# Patient Record
Sex: MALE | Race: WHITE | Employment: FULL TIME | URBAN - METROPOLITAN AREA
[De-identification: names, ages, dates, MRNs, and addresses within clinical notes are randomized per-mention and may not be internally consistent; named-entity substitution may affect disease eponyms.]

---

## 2018-09-26 ENCOUNTER — HOSPITAL ENCOUNTER (EMERGENCY)
Age: 58
Discharge: HOME | End: 2018-09-27
Attending: EMERGENCY MEDICINE
Payer: COMMERCIAL

## 2018-09-26 DIAGNOSIS — R04.0 EPISTAXIS: Primary | ICD-10-CM

## 2018-09-26 PROCEDURE — 74011250637 HC RX REV CODE- 250/637: Performed by: EMERGENCY MEDICINE

## 2018-09-26 PROCEDURE — 75810000284 HC CNTRL NASAL HEMORHRAGE SIMPLE

## 2018-09-26 PROCEDURE — 99283 EMERGENCY DEPT VISIT LOW MDM: CPT

## 2018-09-26 RX ORDER — AMOXICILLIN AND CLAVULANATE POTASSIUM 875; 125 MG/1; MG/1
1 TABLET, FILM COATED ORAL
Status: COMPLETED | OUTPATIENT
Start: 2018-09-26 | End: 2018-09-26

## 2018-09-26 RX ORDER — AMOXICILLIN AND CLAVULANATE POTASSIUM 875; 125 MG/1; MG/1
1 TABLET, FILM COATED ORAL 2 TIMES DAILY
Qty: 6 TAB | Refills: 0 | Status: SHIPPED | OUTPATIENT
Start: 2018-09-27 | End: 2018-09-30

## 2018-09-26 RX ADMIN — BACITRACIN ZINC, NEOMYCIN SULFATE, POLYMYXIN B SULFATE 1 PACKET: 3.5; 5000; 4 OINTMENT TOPICAL at 23:04

## 2018-09-26 RX ADMIN — AMOXICILLIN AND CLAVULANATE POTASSIUM 1 TABLET: 875; 125 TABLET, FILM COATED ORAL at 23:42

## 2018-09-26 NOTE — LETTER
[facility] EMERGENCY DEPT 
[address] [place] [address] [place] [address] 
[phone] Work/School Note Date: 9/26/2018 To Whom It May concern: 
 
Shine Garcia was seen and treated today in the emergency room by the following provider(s): 
Attending Provider: Baljit Agarwal MD. Shine Garcia may return to work on 9/28/2018.  
 
Sincerely, 
 
 
 
 
Baljit Agarwal MD

## 2018-09-27 VITALS
SYSTOLIC BLOOD PRESSURE: 122 MMHG | HEIGHT: 73 IN | WEIGHT: 203 LBS | TEMPERATURE: 97.5 F | BODY MASS INDEX: 26.9 KG/M2 | DIASTOLIC BLOOD PRESSURE: 70 MMHG | RESPIRATION RATE: 20 BRPM | HEART RATE: 78 BPM | OXYGEN SATURATION: 99 %

## 2018-09-27 NOTE — ED NOTES
AIDET communication provided and informed of purposeful rounding to include collaboration of entire care team; patient acknowledged understanding. Nose bleeding controlled

## 2018-09-27 NOTE — ED PROVIDER NOTES
Patient is a 61 y.o. male presenting with epistaxis. The history is provided by the patient and the spouse. Epistaxis This is a recurrent problem. Episode onset: 5pm today. The problem has not changed since onset. The problem is associated with trauma (several months ago). The bleeding has been from the left nare. Past Medical History:  
Diagnosis Date  Rheumatoid arthritis(714.0) as child/20's  Trauma History reviewed. No pertinent surgical history. Family History:  
Problem Relation Age of Onset  Depression Mother  Stroke Father 80  
 Cancer Father 80  Cancer Brother 46  
  prostate Social History Social History  Marital status:  Spouse name: N/A  
 Number of children: N/A  
 Years of education: N/A Occupational History  Not on file. Social History Main Topics  Smoking status: Never Smoker  Smokeless tobacco: Never Used  Alcohol use 0.0 oz/week 2 - 3 Cans of beer per week Comment: occasionally  Drug use: No  
 Sexual activity: Yes  
  Partners: Female Other Topics Concern  Not on file Social History Narrative ALLERGIES: Review of patient's allergies indicates no known allergies. Review of Systems Constitutional: Negative for chills and fever. HENT: Positive for nosebleeds. Negative for facial swelling. Respiratory: Negative for chest tightness and shortness of breath. Neurological: Negative for dizziness, seizures, speech difficulty, weakness, light-headedness and headaches. Vitals:  
 09/26/18 2249 09/26/18 2314 BP: 127/76 122/70 Pulse: 88 78 Resp: 16 20 Temp: 97.5 °F (36.4 °C) SpO2: 98% 99% Weight: 92.1 kg (203 lb) Height: 6' 1\" (1.854 m) Physical Exam  
Constitutional: He is oriented to person, place, and time. He appears well-developed and well-nourished. HENT:  
Head: Normocephalic.   
Right Ear: Tympanic membrane and ear canal normal.  
 Left Ear: Tympanic membrane and ear canal normal.  
Nose: No rhinorrhea, nose lacerations or nasal septal hematoma. Epistaxis is observed. Left nostril with bleeding noted, unable to localize the bleeding on my exam  
Pulmonary/Chest: Effort normal. No respiratory distress. Neurological: He is alert and oriented to person, place, and time. Coordination normal.  
Nursing note and vitals reviewed. MDM Number of Diagnoses or Management Options Epistaxis:  
Diagnosis management comments: Epistaxis - will pack nose and refer to ENT - start ABX to help prevent sinus infection or Toxic shock syndrome ED Course Epistaxis Management Date/Time: 9/26/2018 11:46 PM 
Performed by: Dimitri Hinojosa Authorized by: Dimitri Hinojosa Consent:  
  Consent obtained:  Verbal 
  Consent given by:  Patient Risks discussed:  Bleeding, infection, nasal injury and pain Alternatives discussed:  Referral 
Anesthesia (see MAR for exact dosages): Anesthesia method:  None Procedure details:  
  Treatment site:  L anterior Treatment method:  Merocel sponge Treatment complexity:  Limited Treatment episode: initial   
Post-procedure details:  
  Assessment:  Bleeding decreased (only an occasional drop of blood, may be more of blood tinged saline from the saline I used to expand the nasal tampon) Patient tolerance of procedure: Tolerated well, no immediate complications

## 2018-09-27 NOTE — DISCHARGE INSTRUCTIONS
Nosebleeds: Care Instructions  Your Care Instructions    Nosebleeds are common, especially if you have colds or allergies. Many things can cause a nosebleed. Some nosebleeds stop on their own with pressure. Others need packing. Some get cauterized (sealed). If you have gauze or other packing materials in your nose, you will need to follow up with your doctor to have the packing removed. You may need more treatment if you get nosebleeds a lot. The doctor has checked you carefully, but problems can develop later. If you notice any problems or new symptoms, get medical treatment right away. Follow-up care is a key part of your treatment and safety. Be sure to make and go to all appointments, and call your doctor if you are having problems. It's also a good idea to know your test results and keep a list of the medicines you take. How can you care for yourself at home? · If you get another nosebleed:  ¨ Sit up and tilt your head slightly forward. This keeps blood from going down your throat. ¨ Use your thumb and index finger to pinch your nose shut for 10 minutes. Use a clock. Do not check to see if the bleeding has stopped before the 10 minutes are up. If the bleeding has not stopped, pinch your nose shut for another 10 minutes. ¨ When the bleeding has stopped, try not to pick, rub, or blow your nose for 12 hours. Avoiding these things helps keep your nose from bleeding again. · If your doctor prescribed antibiotics, take them as directed. Do not stop taking them just because you feel better. You need to take the full course of antibiotics. To prevent nosebleeds  · Do not blow your nose too hard. · Try not to lift or strain after a nosebleed. · Raise your head on a pillow while you sleep. · Put a thin layer of a saline- or water-based nasal gel, such as NasoGel, inside your nose. Put it on the septum, which divides your nostrils. This will prevent dryness that can cause nosebleeds.   · Use a vaporizer or humidifier to add moisture to your bedroom. Follow the directions for cleaning the machine. · Do not use aspirin, ibuprofen (Advil, Motrin), or naproxen (Aleve) for 36 to 48 hours after a nosebleed unless your doctor tells you to. You can use acetaminophen (Tylenol) for pain relief. · Talk to your doctor about stopping any other medicines you are taking. Some medicines may make you more likely to get a nosebleed. · Do not use cold medicines or nasal sprays without first talking to your doctor. They can make your nose dry. When should you call for help? Call 911 anytime you think you may need emergency care. For example, call if:    · You passed out (lost consciousness).    Call your doctor now or seek immediate medical care if:    · You get another nosebleed and your nose is still bleeding after you have applied pressure 3 times for 10 minutes each time (30 minutes total).     · There is a lot of blood running down the back of your throat even after you pinch your nose and tilt your head forward.     · You have a fever.     · You have sinus pain.    Watch closely for changes in your health, and be sure to contact your doctor if:    · You get nosebleeds often, even if they stop.     · You do not get better as expected. Where can you learn more? Go to http://alireza-to.info/. Enter S156 in the search box to learn more about \"Nosebleeds: Care Instructions. \"  Current as of: November 20, 2017  Content Version: 11.7  © 3813-3110 [employer]. Care instructions adapted under license by [employer] (which disclaims liability or warranty for this information). If you have questions about a medical condition or this instruction, always ask your healthcare professional. [address] any warranty or liability for your use of this information.

## 2018-09-27 NOTE — ED NOTES
The patient was discharged home by  Dr. Francois Stockton and Cornelia Yarbrough rn in stable condition, accompanied by family. The patient is alert and oriented, is in no respiratory distress and has vital signs within normal limits . The patient's diagnosis, condition and treatment were explained to patient. The patient expressed understanding. Prescriptions given to pt. No work/school note given to pt. A discharge plan has been developed. A  was not involved in the process. Aftercare instructions were given to the patient. family will transport pt home.

## 2018-09-27 NOTE — ED TRIAGE NOTES
Triage note:  Pt arrived with c/o nose bleed that started ~ 5 pm today. Pt stated he fell 5 months ago and hit nose and had a nose bleed at that time. Pt stated he fills that his nose hasn't stopped bleeding since then.

## 2019-01-01 ENCOUNTER — OFFICE VISIT (OUTPATIENT)
Dept: HEMATOLOGY | Age: 59
End: 2019-01-01

## 2019-01-01 ENCOUNTER — TELEPHONE (OUTPATIENT)
Dept: HEMATOLOGY | Age: 59
End: 2019-01-01

## 2019-01-01 VITALS
BODY MASS INDEX: 27.66 KG/M2 | WEIGHT: 208.7 LBS | TEMPERATURE: 98.1 F | SYSTOLIC BLOOD PRESSURE: 121 MMHG | OXYGEN SATURATION: 99 % | HEART RATE: 88 BPM | HEIGHT: 73 IN | DIASTOLIC BLOOD PRESSURE: 80 MMHG

## 2019-01-01 DIAGNOSIS — R74.8 ELEVATED LIVER ENZYMES: Primary | ICD-10-CM

## 2019-01-01 LAB
A1AT SERPL-MCNC: 163 MG/DL (ref 90–200)
ACTIN IGG SERPL-ACNC: 13 UNITS (ref 0–19)
ALBUMIN SERPL-MCNC: 4 G/DL (ref 3.5–5.5)
ALP SERPL-CCNC: 100 IU/L (ref 39–117)
ALT SERPL-CCNC: 37 IU/L (ref 0–44)
ANA TITR SER IF: NEGATIVE {TITER}
AST SERPL-CCNC: 81 IU/L (ref 0–40)
BASOPHILS # BLD AUTO: 0.1 X10E3/UL (ref 0–0.2)
BASOPHILS NFR BLD AUTO: 1 %
BILIRUB DIRECT SERPL-MCNC: 1.12 MG/DL (ref 0–0.4)
BILIRUB SERPL-MCNC: 3.8 MG/DL (ref 0–1.2)
BUN SERPL-MCNC: 5 MG/DL (ref 6–24)
BUN/CREAT SERPL: 7 (ref 9–20)
C-ANCA TITR SER IF: NORMAL TITER
CALCIUM SERPL-MCNC: 9.6 MG/DL (ref 8.7–10.2)
CERULOPLASMIN SERPL-MCNC: 26.6 MG/DL (ref 16–31)
CHLORIDE SERPL-SCNC: 100 MMOL/L (ref 96–106)
CO2 SERPL-SCNC: 25 MMOL/L (ref 20–29)
COMMENT, 144067: NORMAL
CREAT SERPL-MCNC: 0.72 MG/DL (ref 0.76–1.27)
EOSINOPHIL # BLD AUTO: 0.3 X10E3/UL (ref 0–0.4)
EOSINOPHIL NFR BLD AUTO: 4 %
ERYTHROCYTE [DISTWIDTH] IN BLOOD BY AUTOMATED COUNT: 13.3 % (ref 12.3–15.4)
FERRITIN SERPL-MCNC: 654 NG/ML (ref 30–400)
GLUCOSE SERPL-MCNC: 105 MG/DL (ref 65–99)
HAV AB SER QL IA: NEGATIVE
HBV CORE AB SERPL QL IA: NEGATIVE
HBV SURFACE AB SER QL: NON REACTIVE
HBV SURFACE AG SERPL QL IA: NEGATIVE
HCT VFR BLD AUTO: 40.1 % (ref 37.5–51)
HCV AB S/CO SERPL IA: <0.1 S/CO RATIO (ref 0–0.9)
HGB BLD-MCNC: 14.1 G/DL (ref 13–17.7)
IMM GRANULOCYTES # BLD AUTO: 0 X10E3/UL (ref 0–0.1)
IMM GRANULOCYTES NFR BLD AUTO: 0 %
INR PPP: 1.4 (ref 0.8–1.2)
IRON SATN MFR SERPL: 50 % (ref 15–55)
IRON SERPL-MCNC: 158 UG/DL (ref 38–169)
LYMPHOCYTES # BLD AUTO: 2.8 X10E3/UL (ref 0.7–3.1)
LYMPHOCYTES NFR BLD AUTO: 34 %
MCH RBC QN AUTO: 36.2 PG (ref 26.6–33)
MCHC RBC AUTO-ENTMCNC: 35.2 G/DL (ref 31.5–35.7)
MCV RBC AUTO: 103 FL (ref 79–97)
MITOCHONDRIA M2 IGG SER-ACNC: <20 UNITS (ref 0–20)
MONOCYTES # BLD AUTO: 0.7 X10E3/UL (ref 0.1–0.9)
MONOCYTES NFR BLD AUTO: 8 %
NEUTROPHILS # BLD AUTO: 4.3 X10E3/UL (ref 1.4–7)
NEUTROPHILS NFR BLD AUTO: 53 %
P-ANCA ATYPICAL TITR SER IF: NORMAL TITER
P-ANCA TITR SER IF: NORMAL TITER
PLATELET # BLD AUTO: 106 X10E3/UL (ref 150–450)
POTASSIUM SERPL-SCNC: 3.9 MMOL/L (ref 3.5–5.2)
PROT SERPL-MCNC: 7.6 G/DL (ref 6–8.5)
PROTHROMBIN TIME: 14.2 SEC (ref 9.1–12)
RBC # BLD AUTO: 3.9 X10E6/UL (ref 4.14–5.8)
SODIUM SERPL-SCNC: 139 MMOL/L (ref 134–144)
TIBC SERPL-MCNC: 318 UG/DL (ref 250–450)
UIBC SERPL-MCNC: 160 UG/DL (ref 111–343)
WBC # BLD AUTO: 8.1 X10E3/UL (ref 3.4–10.8)

## 2019-07-09 PROBLEM — R74.8 ELEVATED LIVER ENZYMES: Status: ACTIVE | Noted: 2019-01-01

## 2019-07-09 PROBLEM — D69.6 THROMBOCYTOPENIA (HCC): Status: ACTIVE | Noted: 2019-01-01

## 2019-07-09 PROBLEM — M45.9 ANKYLOSING SPONDYLITIS (HCC): Status: ACTIVE | Noted: 2019-01-01

## 2019-07-09 PROBLEM — R26.89 BALANCE PROBLEMS: Status: ACTIVE | Noted: 2019-01-01

## 2019-07-09 NOTE — PROGRESS NOTES
Chief Complaint   Patient presents with    New Patient     Elevated Liver Enzymes     Visit Vitals  /80 (BP 1 Location: Left arm, BP Patient Position: Sitting)   Pulse 88   Temp 98.1 °F (36.7 °C) (Tympanic)   Ht 6' 1\" (1.854 m)   Wt 208 lb 11.2 oz (94.7 kg)   SpO2 99%   BMI 27.53 kg/m²     Abuse Screening Questionnaire 7/9/2019   Do you ever feel afraid of your partner? N   Are you in a relationship with someone who physically or mentally threatens you? N   Is it safe for you to go home?  Y     Learning Assessment 7/9/2019   PRIMARY LEARNER Patient   HIGHEST LEVEL OF EDUCATION - PRIMARY LEARNER  > 4 YEARS OF COLLEGE   BARRIERS PRIMARY LEARNER NONE   PRIMARY LANGUAGE ENGLISH   LEARNER PREFERENCE PRIMARY LISTENING   ANSWERED BY patient    RELATIONSHIP SELF

## 2019-07-09 NOTE — PROGRESS NOTES
[facility], Jerald CANTU Remigio Saliva, MD Norberto Martinet, PA-C Natha Carrow, [facility]     Erendira CABRERA Yue, [facility]   DENICE Davis-MARYCHUY Wilkins, [facility]       Anahi Gray [facility] 136    at [address], [address] [place] [address], [facility] 22.    [phone]    FAX: [address] [place] [address], [address]    [phone]    FAX: [phone]       Patient Care Team:  Kyra Colindres DO as PCP - General (Internal Medicine)      Problem List  Date Reviewed: 11/13/2013          Codes Class Noted    Elevated liver enzymes ICD-10-CM: R74.8  ICD-9-CM: 790.5  7/9/2019            The clinicians listed above have asked me to see Justina Yao in consultation regarding elevated liver enzymes and its management. All medical records sent by the referring physicians were reviewed including imaging studies and laboratory studies. The patient is a 60 y.o.  male who was found to have abnormalities in liver enzymes in summer of 2013. Serologic evaluation for markers of chronic liver disease was negative for ASMA. Ferritin was elevated at 658      Ultrasound of the liver was performed in 6/2019 which showed abnormal appearance of the liver with diffuse hepatic heterogeneity consistent with chronic liver disease. An assessment of liver fibrosis with biopsy or elastography has not been performed. The patient had not started any new medications within 3 months preceding the elevation in liver chemistries. The patient has no symptoms which can be attributed to the liver disorder.     The patient has not experienced the following symptoms: fatigue, pain in the right side over the liver, yellowing of the eyes or skin, itching, dark urine, problems concentrating,   . The patient completes all daily activities without any functional limitations. ASSESSMENT AND PLAN:  Elevated liver enzymes  Persistent elevation in liver transaminases and alkaline phosphatase of unclear etiology at this time. AST is elevated. ALT is normal.  ALP is normal.  Total bilirubin is elevated. Serum albumin is normal.  INR is prolonged. The platelet count is depressed. Based upon laboratory studies the patient appears to have advanced liver disease. Serologic testing for causes of chronic liver disease were ordered. All were negative     The most likely causes for the liver chemistry abnormalities were discussed with the patient and include fatty liver disease do to metabolic syndrome     Have performed laboratory testing to monitor liver function and degree of liver injury. This included BMP, hepatic panel, CBC with platelet count, INR. Will perform imaging of the liver with ultrasound. The need to perform a liver biopsy to help determine the cause and severity of the liver test abnormalities was discussed. The risks of performing the liver biopsy including pain, puncture of the lung, gallbladder, intestine or kidney and bleeding were discussed. The patient has decided to have a liver biopsy. This will be scheduled. 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 This benign genetic disorder of bilirubin conjugation has no clinical significance. Elevation in Ferritin  There is an elevation in ferritin with normal iron saturation. It is unlikely that the patient has hemochromatosis or is a carrier for an HFE gene. Will order HFE genetic testing. The elevation in ferritin is probably secondary to alcohol use.       Screening for Hepatocellular Carcinoma  HCC screening is not necessary if the patient has no evidence of cirrhosis. Treatment of other medical problems in patients with chronic liver disease  There are no contraindications for the patient to take most medications that are necessary for treatment of other medical issues. Counseling for alcohol in patients with chronic liver disease  The patient was counseled regarding alcohol consumption and the effect of alcohol on chronic liver disease. Vaccinations   Vaccination for viral hepatitis A and B is recommended since the patient has no serologic evidence of previous exposure or vaccination with immunity. Routine vaccinations against other bacterial and viral agents can be performed as indicated. Annual flu vaccination should be administered if indicated. ALLERGIES  No Known Allergies    MEDICATIONS  No current outpatient medications on file. No current facility-administered medications for this visit. SYSTEM REVIEW NOT RELATED TO LIVER DISEASE OR REVIEWED ABOVE:  Constitution systems: Negative for fever, chills, weight gain, weight loss. Eyes: Negative for visual changes. ENT: Negative for sore throat, painful swallowing. Respiratory: Negative for cough, hemoptysis, SOB. Cardiology: Negative for chest pain, palpitations. GI:  Negative for constipation or diarrhea. : Negative for urinary frequency, dysuria, hematuria, nocturia. Skin: Negative for rash. Hematology: Negative for easy bruising, blood clots. Musculo-skelatal: Negative for back pain, muscle pain, weakness. Neurologic: Negative for headaches, dizziness, vertigo, memory problems not related to HE. Psychology: Negative for anxiety, depression. FAMILY HISTORY:  The father is  from complications of stroke. The mother .  of of natural causes  There is no family history of liver disease. SOCIAL HISTORY:  The patient is . The patient has 2 children.     The patient does not smoke cigarettes  The patient drinks 1 alcoholic beverage daily.  The patient currently works full time for [employer] as a .      PHYSICAL EXAMINATION:  Visit Vitals  /80 (BP 1 Location: Left arm, BP Patient Position: Sitting)   Pulse 88   Temp 98.1 °F (36.7 °C) (Tympanic)   Ht 6' 1\" (1.854 m)   Wt 208 lb 11.2 oz (94.7 kg)   SpO2 99%   BMI 27.53 kg/m²     General: No acute distress. Eyes: Sclera anicteric. ENT: No oral lesions. Thyroid normal.  Nodes: No adenopathy. Skin: No spider angiomata. No jaundice. No palmar erythema. Respiratory: Lungs clear to auscultation. Cardiovascular: Regular heart rate. No murmurs. No JVD. Abdomen: Soft non-tender. Liver size normal to percussion/palpation. Spleen not palpable. No obvious ascites. Extremities: No edema. No muscle wasting. No gross arthritic changes. Neurologic: Alert and oriented. Cranial nerves grossly intact. No asterixis.     LABORATORY STUDIES:  [facility] [address] Units 7/9/2019   WBC 3.4 - 10.8 x10E3/uL 8.1   ANC 1.4 - 7.0 x10E3/uL 4.3   HGB 13.0 - 17.7 g/dL 14.1    - 450 x10E3/uL 106 (L)   INR 0.8 - 1.2 1.4 (H)   AST 0 - 40 IU/L 81 (H)   ALT 0 - 44 IU/L 37   Alk Phos 39 - 117 IU/L 100   Bili, Total 0.0 - 1.2 mg/dL 3.8 (H)   Bili, Direct 0.00 - 0.40 mg/dL 1.12 (H)   Albumin 3.5 - 5.5 g/dL 4.0   BUN 6 - 24 mg/dL 5 (L)   Creat 0.76 - 1.27 mg/dL 0.72 (L)   Na 134 - 144 mmol/L 139   K 3.5 - 5.2 mmol/L 3.9   Cl 96 - 106 mmol/L 100   CO2 20 - 29 mmol/L 25   Glucose 65 - 99 mg/dL 105 (H)     SEROLOGIES:  Serologies Latest Ref Rng & Units 7/9/2019   Hep A Ab, Total Negative Negative   Hep B Surface Ag Negative Negative   Hep B Core Ab, Total Negative Negative   Hep B Surface AB QL  Non Reactive   Hep C Ab 0.0 - 0.9 s/co ratio <0.1   Ferritin 30 - 400 ng/mL 654 (H)   Iron % Saturation 15 - 55 % 50   DALIA, IFA  Negative   C-ANCA Neg:<1:20 titer <1:20   P-ANCA Neg:<1:20 titer <1:20   ANCA Neg:<1:20 titer <1:20   ASMCA 0 - 19 Units 13   M2 Ab 0.0 - 20.0 Units <20.0   Ceruloplasmin 16.0 - 31.0 mg/dL 26.6   Alpha-1 antitrypsin level 90 - 200 mg/dL 163     LIVER HISTOLOGY:  Not available or performed    ENDOSCOPIC PROCEDURES:  Not available or performed    RADIOLOGY:  Not available or performed    OTHER TESTING:  Not available or performed    FOLLOW-UP:  All of the issues listed above in the Assessment and Plan were discussed with the patient. All questions were answered. The patient expressed a clear understanding of the above. [address] [place] [address] in 4 weeks for Fibroscan to review all data and determine the treatment plan.     Krunal Roberts MD  [address], [address] [place] [address] 22.  [phone]  [address] [place] [address]

## 2019-07-09 NOTE — Clinical Note
7/27/19 Patient: Xavier Duenas YOB: 1960 Date of Visit: 7/9/2019 [address] [address] VIA Facsimile: [phone] Dear Balwinder Coburn DO, Thank you for referring Mr. Khadijah Vazquez to [facility] for evaluation. My notes for this consultation are attached. If you have questions, please do not hesitate to call me. I look forward to following your patient along with you. Sincerely, Emily Zapata MD

## 2019-09-10 NOTE — TELEPHONE ENCOUNTER
Spoke with patient. Patient rescheduled for LBX in Oct 2019. Follow up with fibroscan  scheduled two weeks later Nov 2019. Instructions for lbx and follow fibroscan mailed to patient. Patient had no further questions.

## 2020-01-01 ENCOUNTER — HOSPITAL ENCOUNTER (INPATIENT)
Age: 60
LOS: 1 days | DRG: 673 | End: 2020-04-16
Attending: EMERGENCY MEDICINE
Payer: COMMERCIAL

## 2020-01-01 ENCOUNTER — APPOINTMENT (OUTPATIENT)
Dept: CT IMAGING | Age: 60
DRG: 673 | End: 2020-01-01
Attending: EMERGENCY MEDICINE
Payer: COMMERCIAL

## 2020-01-01 ENCOUNTER — ANESTHESIA EVENT (OUTPATIENT)
Dept: SURGERY | Age: 60
DRG: 673 | End: 2020-01-01
Payer: COMMERCIAL

## 2020-01-01 ENCOUNTER — APPOINTMENT (OUTPATIENT)
Dept: GENERAL RADIOLOGY | Age: 60
DRG: 673 | End: 2020-01-01
Payer: COMMERCIAL

## 2020-01-01 ENCOUNTER — ANESTHESIA (OUTPATIENT)
Dept: SURGERY | Age: 60
DRG: 673 | End: 2020-01-01
Payer: COMMERCIAL

## 2020-01-01 ENCOUNTER — APPOINTMENT (OUTPATIENT)
Dept: NON INVASIVE DIAGNOSTICS | Age: 60
DRG: 673 | End: 2020-01-01
Attending: INTERNAL MEDICINE
Payer: COMMERCIAL

## 2020-01-01 ENCOUNTER — APPOINTMENT (OUTPATIENT)
Dept: GENERAL RADIOLOGY | Age: 60
DRG: 673 | End: 2020-01-01
Attending: EMERGENCY MEDICINE
Payer: COMMERCIAL

## 2020-01-01 VITALS
WEIGHT: 220 LBS | SYSTOLIC BLOOD PRESSURE: 50 MMHG | TEMPERATURE: 98.8 F | HEIGHT: 76 IN | OXYGEN SATURATION: 58 % | DIASTOLIC BLOOD PRESSURE: 40 MMHG | BODY MASS INDEX: 26.79 KG/M2

## 2020-01-01 DIAGNOSIS — I72.2 ANEURYSM OF RIGHT RENAL ARTERY (HCC): Primary | ICD-10-CM

## 2020-01-01 DIAGNOSIS — R17 JAUNDICE: ICD-10-CM

## 2020-01-01 DIAGNOSIS — R79.1 ABNORMAL INR: ICD-10-CM

## 2020-01-01 DIAGNOSIS — D69.6 THROMBOCYTOPENIA (HCC): ICD-10-CM

## 2020-01-01 DIAGNOSIS — R74.01 TRANSAMINITIS: ICD-10-CM

## 2020-01-01 DIAGNOSIS — E80.6 BILIRUBINEMIA: ICD-10-CM

## 2020-01-01 DIAGNOSIS — R17 SCLERAL ICTERUS: ICD-10-CM

## 2020-01-01 DIAGNOSIS — E87.20 LACTIC ACIDOSIS: ICD-10-CM

## 2020-01-01 LAB
ALBUMIN SERPL-MCNC: 1.3 G/DL (ref 3.5–5)
ALBUMIN SERPL-MCNC: 1.6 G/DL (ref 3.5–5)
ALBUMIN SERPL-MCNC: 2.3 G/DL (ref 3.5–5)
ALBUMIN/GLOB SERPL: 0.6 {RATIO} (ref 1.1–2.2)
ALBUMIN/GLOB SERPL: 0.9 {RATIO} (ref 1.1–2.2)
ALBUMIN/GLOB SERPL: 0.9 {RATIO} (ref 1.1–2.2)
ALP SERPL-CCNC: 124 U/L (ref 45–117)
ALP SERPL-CCNC: 46 U/L (ref 45–117)
ALP SERPL-CCNC: 50 U/L (ref 45–117)
ALT SERPL-CCNC: 242 U/L (ref 12–78)
ALT SERPL-CCNC: 48 U/L (ref 12–78)
ALT SERPL-CCNC: 76 U/L (ref 12–78)
ANION GAP SERPL CALC-SCNC: 11 MMOL/L (ref 5–15)
ANION GAP SERPL CALC-SCNC: 18 MMOL/L (ref 5–15)
ANION GAP SERPL CALC-SCNC: 22 MMOL/L (ref 5–15)
ANION GAP SERPL CALC-SCNC: 24 MMOL/L (ref 5–15)
APTT PPP: >130 SEC (ref 22.1–32)
ARTERIAL PATENCY WRIST A: ABNORMAL
AST SERPL-CCNC: 1364 U/L (ref 15–37)
AST SERPL-CCNC: 153 U/L (ref 15–37)
AST SERPL-CCNC: 419 U/L (ref 15–37)
ATRIAL RATE: 103 BPM
AV VELOCITY RATIO: 0.63
BASE DEFICIT BLDA-SCNC: 18 MMOL/L
BASE DEFICIT BLDA-SCNC: 21.1 MMOL/L
BASE DEFICIT BLDA-SCNC: 25.1 MMOL/L
BASE DEFICIT BLDA-SCNC: 27 MMOL/L
BASE DEFICIT BLDA-SCNC: 27 MMOL/L
BASOPHILS # BLD: 0 K/UL (ref 0–0.1)
BASOPHILS # BLD: 0.1 K/UL (ref 0–0.1)
BASOPHILS NFR BLD: 0 % (ref 0–1)
BASOPHILS NFR BLD: 1 % (ref 0–1)
BDY SITE: ABNORMAL
BILIRUB DIRECT SERPL-MCNC: 3.5 MG/DL (ref 0–0.2)
BILIRUB SERPL-MCNC: 2.6 MG/DL (ref 0.2–1)
BILIRUB SERPL-MCNC: 2.8 MG/DL (ref 0.2–1)
BILIRUB SERPL-MCNC: 6.5 MG/DL (ref 0.2–1)
BUN SERPL-MCNC: 4 MG/DL (ref 6–20)
BUN SERPL-MCNC: 5 MG/DL (ref 6–20)
BUN SERPL-MCNC: 6 MG/DL (ref 6–20)
BUN SERPL-MCNC: 6 MG/DL (ref 6–20)
BUN/CREAT SERPL: 3 (ref 12–20)
BUN/CREAT SERPL: 4 (ref 12–20)
CALCIUM SERPL-MCNC: 6.2 MG/DL (ref 8.5–10.1)
CALCIUM SERPL-MCNC: 7.5 MG/DL (ref 8.5–10.1)
CALCIUM SERPL-MCNC: 8.1 MG/DL (ref 8.5–10.1)
CALCIUM SERPL-MCNC: 8.1 MG/DL (ref 8.5–10.1)
CALCULATED P AXIS, ECG09: -9 DEGREES
CALCULATED R AXIS, ECG10: 17 DEGREES
CALCULATED T AXIS, ECG11: 31 DEGREES
CHLORIDE SERPL-SCNC: 100 MMOL/L (ref 97–108)
CHLORIDE SERPL-SCNC: 110 MMOL/L (ref 97–108)
CHLORIDE SERPL-SCNC: 111 MMOL/L (ref 97–108)
CHLORIDE SERPL-SCNC: 115 MMOL/L (ref 97–108)
CO2 SERPL-SCNC: 12 MMOL/L (ref 21–32)
CO2 SERPL-SCNC: 13 MMOL/L (ref 21–32)
CO2 SERPL-SCNC: 15 MMOL/L (ref 21–32)
CO2 SERPL-SCNC: 26 MMOL/L (ref 21–32)
COMMENT, HOLDF: NORMAL
CORTIS SERPL-MCNC: 32.9 UG/DL
CREAT SERPL-MCNC: 1.1 MG/DL (ref 0.7–1.3)
CREAT SERPL-MCNC: 1.35 MG/DL (ref 0.7–1.3)
CREAT SERPL-MCNC: 1.65 MG/DL (ref 0.7–1.3)
CREAT SERPL-MCNC: 2.03 MG/DL (ref 0.7–1.3)
DIAGNOSIS, 93000: NORMAL
DIFFERENTIAL METHOD BLD: ABNORMAL
ECHO AO ROOT DIAM: 3.63 CM
ECHO AV AREA PEAK VELOCITY: 1.8 CM2
ECHO AV PEAK GRADIENT: 6.7 MMHG
ECHO AV PEAK VELOCITY: 129.88 CM/S
ECHO LA MAJOR AXIS: 2.68 CM
ECHO LA TO AORTIC ROOT RATIO: 0.74
ECHO LA VOL 2C: 17.94 ML (ref 18–58)
ECHO LA VOL 4C: 28.64 ML (ref 18–58)
ECHO LA VOL BP: 26.72 ML (ref 18–58)
ECHO LA VOL/BSA BIPLANE: 11.58 ML/M2 (ref 16–28)
ECHO LA VOLUME INDEX A2C: 7.78 ML/M2 (ref 16–28)
ECHO LA VOLUME INDEX A4C: 12.41 ML/M2 (ref 16–28)
ECHO LV INTERNAL DIMENSION DIASTOLIC: 4.15 CM (ref 4.2–5.9)
ECHO LV INTERNAL DIMENSION SYSTOLIC: 3.05 CM
ECHO LV IVSD: 1.23 CM (ref 0.6–1)
ECHO LV MASS 2D: 172.1 G (ref 88–224)
ECHO LV MASS INDEX 2D: 74.6 G/M2 (ref 49–115)
ECHO LV POSTERIOR WALL DIASTOLIC: 0.92 CM (ref 0.6–1)
ECHO LVOT DIAM: 1.91 CM
ECHO LVOT PEAK GRADIENT: 2.7 MMHG
ECHO LVOT PEAK VELOCITY: 81.73 CM/S
ECHO MV A VELOCITY: 73.23 CM/S
ECHO MV E DECELERATION TIME (DT): 152.2 MS
ECHO MV E VELOCITY: 40.62 CM/S
ECHO MV E/A RATIO: 0.55
ECHO PV MAX VELOCITY: 76.41 CM/S
ECHO PV PEAK GRADIENT: 2.3 MMHG
ECHO RV INTERNAL DIMENSION: 3.65 CM
ECHO TV REGURGITANT MAX VELOCITY: 234.8 CM/S
ECHO TV REGURGITANT PEAK GRADIENT: 22.1 MMHG
EOSINOPHIL # BLD: 0 K/UL (ref 0–0.4)
EOSINOPHIL # BLD: 0.1 K/UL (ref 0–0.4)
EOSINOPHIL # BLD: 0.2 K/UL (ref 0–0.4)
EOSINOPHIL NFR BLD: 0 % (ref 0–7)
EOSINOPHIL NFR BLD: 1 % (ref 0–7)
EOSINOPHIL NFR BLD: 2 % (ref 0–7)
EPAP/CPAP/PEEP, PAPEEP: 6
ERYTHROCYTE [DISTWIDTH] IN BLOOD BY AUTOMATED COUNT: 16.2 % (ref 11.5–14.5)
ERYTHROCYTE [DISTWIDTH] IN BLOOD BY AUTOMATED COUNT: 17.2 % (ref 11.5–14.5)
ERYTHROCYTE [DISTWIDTH] IN BLOOD BY AUTOMATED COUNT: 18.1 % (ref 11.5–14.5)
ERYTHROCYTE [DISTWIDTH] IN BLOOD BY AUTOMATED COUNT: 18.4 % (ref 11.5–14.5)
ERYTHROCYTE [DISTWIDTH] IN BLOOD BY AUTOMATED COUNT: 21.7 % (ref 11.5–14.5)
ERYTHROCYTE [DISTWIDTH] IN BLOOD BY AUTOMATED COUNT: 22.4 % (ref 11.5–14.5)
FIBRINOGEN PPP-MCNC: <60 MG/DL (ref 200–475)
FIO2 ON VENT: 100 %
GAS FLOW.O2 SETTING OXYMISER: 12 L/MIN
GAS FLOW.O2 SETTING OXYMISER: 22 L/MIN
GAS FLOW.O2 SETTING OXYMISER: 22 L/MIN
GAS FLOW.O2 SETTING OXYMISER: 30 L/MIN
GAS FLOW.O2 SETTING OXYMISER: 30 L/MIN
GLOBULIN SER CALC-MCNC: 1.4 G/DL (ref 2–4)
GLOBULIN SER CALC-MCNC: 1.8 G/DL (ref 2–4)
GLOBULIN SER CALC-MCNC: 4 G/DL (ref 2–4)
GLUCOSE SERPL-MCNC: 130 MG/DL (ref 65–100)
GLUCOSE SERPL-MCNC: 155 MG/DL (ref 65–100)
GLUCOSE SERPL-MCNC: 171 MG/DL (ref 65–100)
GLUCOSE SERPL-MCNC: 180 MG/DL (ref 65–100)
HCO3 BLDA-SCNC: 12 MMOL/L (ref 22–26)
HCO3 BLDA-SCNC: 14 MMOL/L (ref 22–26)
HCO3 BLDA-SCNC: 6 MMOL/L (ref 22–26)
HCO3 BLDA-SCNC: 8 MMOL/L (ref 22–26)
HCO3 BLDA-SCNC: 9 MMOL/L (ref 22–26)
HCT VFR BLD AUTO: 18.4 % (ref 36.6–50.3)
HCT VFR BLD AUTO: 19.1 % (ref 36.6–50.3)
HCT VFR BLD AUTO: 23.5 % (ref 36.6–50.3)
HCT VFR BLD AUTO: 24.5 % (ref 36.6–50.3)
HCT VFR BLD AUTO: 27 % (ref 36.6–50.3)
HCT VFR BLD AUTO: 32.9 % (ref 36.6–50.3)
HGB BLD-MCNC: 11.4 G/DL (ref 12.1–17)
HGB BLD-MCNC: 5.6 G/DL (ref 12.1–17)
HGB BLD-MCNC: 5.6 G/DL (ref 12.1–17)
HGB BLD-MCNC: 7.1 G/DL (ref 12.1–17)
HGB BLD-MCNC: 7.6 G/DL (ref 12.1–17)
HGB BLD-MCNC: 8.1 G/DL (ref 12.1–17)
IMM GRANULOCYTES # BLD AUTO: 0 K/UL
IMM GRANULOCYTES NFR BLD AUTO: 0 %
INR PPP: 1.9 (ref 0.9–1.1)
INR PPP: 2.2 (ref 0.9–1.1)
INR PPP: 2.5 (ref 0.9–1.1)
INR PPP: >13.7 (ref 0.9–1.1)
LACTATE SERPL-SCNC: 17.8 MMOL/L (ref 0.4–2)
LACTATE SERPL-SCNC: 25.2 MMOL/L (ref 0.4–2)
LACTATE SERPL-SCNC: 5.2 MMOL/L (ref 0.4–2)
LIPASE SERPL-CCNC: 310 U/L (ref 73–393)
LVFS 2D: 26.53 %
LYMPHOCYTES # BLD: 1.7 K/UL (ref 0.8–3.5)
LYMPHOCYTES # BLD: 1.8 K/UL (ref 0.8–3.5)
LYMPHOCYTES # BLD: 2 K/UL (ref 0.8–3.5)
LYMPHOCYTES # BLD: 2.3 K/UL (ref 0.8–3.5)
LYMPHOCYTES # BLD: 2.5 K/UL (ref 0.8–3.5)
LYMPHOCYTES NFR BLD: 22 % (ref 12–49)
LYMPHOCYTES NFR BLD: 25 % (ref 12–49)
LYMPHOCYTES NFR BLD: 25 % (ref 12–49)
LYMPHOCYTES NFR BLD: 28 % (ref 12–49)
LYMPHOCYTES NFR BLD: 32 % (ref 12–49)
MCH RBC QN AUTO: 30.1 PG (ref 26–34)
MCH RBC QN AUTO: 30.8 PG (ref 26–34)
MCH RBC QN AUTO: 30.9 PG (ref 26–34)
MCH RBC QN AUTO: 31.5 PG (ref 26–34)
MCH RBC QN AUTO: 32.6 PG (ref 26–34)
MCH RBC QN AUTO: 38.3 PG (ref 26–34)
MCHC RBC AUTO-ENTMCNC: 29.3 G/DL (ref 30–36.5)
MCHC RBC AUTO-ENTMCNC: 30 G/DL (ref 30–36.5)
MCHC RBC AUTO-ENTMCNC: 30.2 G/DL (ref 30–36.5)
MCHC RBC AUTO-ENTMCNC: 30.4 G/DL (ref 30–36.5)
MCHC RBC AUTO-ENTMCNC: 31 G/DL (ref 30–36.5)
MCHC RBC AUTO-ENTMCNC: 34.7 G/DL (ref 30–36.5)
MCV RBC AUTO: 100.4 FL (ref 80–99)
MCV RBC AUTO: 101.1 FL (ref 80–99)
MCV RBC AUTO: 101.7 FL (ref 80–99)
MCV RBC AUTO: 105.5 FL (ref 80–99)
MCV RBC AUTO: 107.8 FL (ref 80–99)
MCV RBC AUTO: 110.4 FL (ref 80–99)
METAMYELOCYTES NFR BLD MANUAL: 1 %
METAMYELOCYTES NFR BLD MANUAL: 2 %
METAMYELOCYTES NFR BLD MANUAL: 4 %
METAMYELOCYTES NFR BLD MANUAL: 5 %
MONOCYTES # BLD: 0.4 K/UL (ref 0–1)
MONOCYTES # BLD: 0.6 K/UL (ref 0–1)
MONOCYTES # BLD: 0.8 K/UL (ref 0–1)
MONOCYTES NFR BLD: 4 % (ref 5–13)
MONOCYTES NFR BLD: 6 % (ref 5–13)
MONOCYTES NFR BLD: 7 % (ref 5–13)
MONOCYTES NFR BLD: 7 % (ref 5–13)
MONOCYTES NFR BLD: 9 % (ref 5–13)
MV DEC SLOPE: 2.67
MYELOCYTES NFR BLD MANUAL: 0 %
MYELOCYTES NFR BLD MANUAL: 2 %
MYELOCYTES NFR BLD MANUAL: 3 %
NEUTS BAND NFR BLD MANUAL: 15 % (ref 0–6)
NEUTS BAND NFR BLD MANUAL: 23 % (ref 0–6)
NEUTS BAND NFR BLD MANUAL: 25 % (ref 0–6)
NEUTS BAND NFR BLD MANUAL: 26 % (ref 0–6)
NEUTS BAND NFR BLD MANUAL: 3 % (ref 0–6)
NEUTS SEG # BLD: 3 K/UL (ref 1.8–8)
NEUTS SEG # BLD: 4.9 K/UL (ref 1.8–8)
NEUTS SEG # BLD: 5.2 K/UL (ref 1.8–8)
NEUTS SEG # BLD: 6 K/UL (ref 1.8–8)
NEUTS SEG # BLD: 6.1 K/UL (ref 1.8–8)
NEUTS SEG NFR BLD: 34 % (ref 32–75)
NEUTS SEG NFR BLD: 41 % (ref 32–75)
NEUTS SEG NFR BLD: 41 % (ref 32–75)
NEUTS SEG NFR BLD: 44 % (ref 32–75)
NEUTS SEG NFR BLD: 62 % (ref 32–75)
NRBC # BLD: 0.04 K/UL (ref 0–0.01)
NRBC # BLD: 0.11 K/UL (ref 0–0.01)
NRBC # BLD: 0.22 K/UL (ref 0–0.01)
NRBC # BLD: 0.22 K/UL (ref 0–0.01)
NRBC # BLD: 0.23 K/UL (ref 0–0.01)
NRBC # BLD: 0.26 K/UL (ref 0–0.01)
NRBC BLD-RTO: 0.4 PER 100 WBC
NRBC BLD-RTO: 1.5 PER 100 WBC
NRBC BLD-RTO: 2.4 PER 100 WBC
NRBC BLD-RTO: 2.4 PER 100 WBC
NRBC BLD-RTO: 2.5 PER 100 WBC
NRBC BLD-RTO: 4.4 PER 100 WBC
P-R INTERVAL, ECG05: 124 MS
PATH REV BLD -IMP: NORMAL
PCO2 BLDA: 35 MMHG (ref 35–45)
PCO2 BLDA: 41 MMHG (ref 35–45)
PCO2 BLDA: 55 MMHG (ref 35–45)
PCO2 BLDA: 58 MMHG (ref 35–45)
PCO2 BLDA: 59 MMHG (ref 35–45)
PH BLDA: 6.86 [PH] (ref 7.35–7.45)
PH BLDA: 6.87 [PH] (ref 7.35–7.45)
PH BLDA: 6.89 [PH] (ref 7.35–7.45)
PH BLDA: ABNORMAL [PH] (ref 7.35–7.45)
PH BLDA: ABNORMAL [PH] (ref 7.35–7.45)
PLATELET # BLD AUTO: 103 K/UL (ref 150–400)
PLATELET # BLD AUTO: 110 K/UL (ref 150–400)
PLATELET # BLD AUTO: 23 K/UL (ref 150–400)
PLATELET # BLD AUTO: 69 K/UL (ref 150–400)
PLATELET # BLD AUTO: 77 K/UL (ref 150–400)
PLATELET # BLD AUTO: 79 K/UL (ref 150–400)
PLATELET COMMENTS,PCOM: ABNORMAL
PMV BLD AUTO: 10 FL (ref 8.9–12.9)
PMV BLD AUTO: 10 FL (ref 8.9–12.9)
PMV BLD AUTO: 13 FL (ref 8.9–12.9)
PMV BLD AUTO: 9.6 FL (ref 8.9–12.9)
PMV BLD AUTO: 9.7 FL (ref 8.9–12.9)
PO2 BLDA: 144 MMHG (ref 80–100)
PO2 BLDA: 186 MMHG (ref 80–100)
PO2 BLDA: 204 MMHG (ref 80–100)
PO2 BLDA: 95 MMHG (ref 80–100)
PO2 BLDA: 98 MMHG (ref 80–100)
POTASSIUM SERPL-SCNC: 3.6 MMOL/L (ref 3.5–5.1)
POTASSIUM SERPL-SCNC: 4 MMOL/L (ref 3.5–5.1)
POTASSIUM SERPL-SCNC: 4.2 MMOL/L (ref 3.5–5.1)
POTASSIUM SERPL-SCNC: 4.6 MMOL/L (ref 3.5–5.1)
PROT SERPL-MCNC: 2.7 G/DL (ref 6.4–8.2)
PROT SERPL-MCNC: 3.4 G/DL (ref 6.4–8.2)
PROT SERPL-MCNC: 6.3 G/DL (ref 6.4–8.2)
PROTHROMBIN TIME: 19 SEC (ref 9–11.1)
PROTHROMBIN TIME: 21.8 SEC (ref 9–11.1)
PROTHROMBIN TIME: 23.1 SEC (ref 9–11.1)
PROTHROMBIN TIME: >120 SEC (ref 9–11.1)
PV END DIASTOLIC VELOCITY: 0.8 MMHG
Q-T INTERVAL, ECG07: 356 MS
QRS DURATION, ECG06: 90 MS
QTC CALCULATION (BEZET), ECG08: 466 MS
RBC # BLD AUTO: 1.81 M/UL (ref 4.1–5.7)
RBC # BLD AUTO: 1.82 M/UL (ref 4.1–5.7)
RBC # BLD AUTO: 2.18 M/UL (ref 4.1–5.7)
RBC # BLD AUTO: 2.41 M/UL (ref 4.1–5.7)
RBC # BLD AUTO: 2.69 M/UL (ref 4.1–5.7)
RBC # BLD AUTO: 2.98 M/UL (ref 4.1–5.7)
RBC MORPH BLD: ABNORMAL
SAMPLES BEING HELD,HOLD: NORMAL
SAO2 % BLD: 91 % (ref 92–97)
SAO2 % BLD: 92 % (ref 92–97)
SAO2 % BLD: 97 % (ref 92–97)
SAO2 % BLD: 98 % (ref 92–97)
SAO2 % BLD: 99 % (ref 92–97)
SAO2% DEVICE SAO2% SENSOR NAME: ABNORMAL
SERVICE CMNT-IMP: ABNORMAL
SODIUM SERPL-SCNC: 137 MMOL/L (ref 136–145)
SODIUM SERPL-SCNC: 145 MMOL/L (ref 136–145)
SODIUM SERPL-SCNC: 147 MMOL/L (ref 136–145)
SODIUM SERPL-SCNC: 148 MMOL/L (ref 136–145)
SPECIMEN SITE: ABNORMAL
THERAPEUTIC RANGE,PTTT: ABNORMAL SECS (ref 58–77)
TROPONIN I SERPL-MCNC: <0.05 NG/ML
VENTILATION MODE VENT: ABNORMAL
VENTRICULAR RATE, ECG03: 103 BPM
VT SETTING VENT: 450 ML
VT SETTING VENT: 450 ML
VT SETTING VENT: 480 ML
VT SETTING VENT: 480 ML
VT SETTING VENT: 500 ML
WBC # BLD AUTO: 11.1 K/UL (ref 4.1–11.1)
WBC # BLD AUTO: 5.3 K/UL (ref 4.1–11.1)
WBC # BLD AUTO: 7.3 K/UL (ref 4.1–11.1)
WBC # BLD AUTO: 8.8 K/UL (ref 4.1–11.1)
WBC # BLD AUTO: 9.2 K/UL (ref 4.1–11.1)
WBC # BLD AUTO: 9.3 K/UL (ref 4.1–11.1)
WBC MORPH BLD: ABNORMAL

## 2020-01-01 PROCEDURE — 74011250636 HC RX REV CODE- 250/636

## 2020-01-01 PROCEDURE — 74011000258 HC RX REV CODE- 258: Performed by: INTERNAL MEDICINE

## 2020-01-01 PROCEDURE — 77030013848 HC PK NSL EPITAX S&N -B

## 2020-01-01 PROCEDURE — 74011000258 HC RX REV CODE- 258: Performed by: EMERGENCY MEDICINE

## 2020-01-01 PROCEDURE — 74011250636 HC RX REV CODE- 250/636: Performed by: INTERNAL MEDICINE

## 2020-01-01 PROCEDURE — 80053 COMPREHEN METABOLIC PANEL: CPT

## 2020-01-01 PROCEDURE — C1769 GUIDE WIRE: HCPCS

## 2020-01-01 PROCEDURE — P9016 RBC LEUKOCYTES REDUCED: HCPCS

## 2020-01-01 PROCEDURE — 77030007181 HC COIL EMB TORN COOK -B

## 2020-01-01 PROCEDURE — 99285 EMERGENCY DEPT VISIT HI MDM: CPT

## 2020-01-01 PROCEDURE — 76060000033 HC ANESTHESIA 1 TO 1.5 HR

## 2020-01-01 PROCEDURE — 96375 TX/PRO/DX INJ NEW DRUG ADDON: CPT

## 2020-01-01 PROCEDURE — 74011250636 HC RX REV CODE- 250/636: Performed by: EMERGENCY MEDICINE

## 2020-01-01 PROCEDURE — 99291 CRITICAL CARE FIRST HOUR: CPT

## 2020-01-01 PROCEDURE — C9113 INJ PANTOPRAZOLE SODIUM, VIA: HCPCS | Performed by: INTERNAL MEDICINE

## 2020-01-01 PROCEDURE — C1894 INTRO/SHEATH, NON-LASER: HCPCS

## 2020-01-01 PROCEDURE — 76010000149 HC OR TIME 1 TO 1.5 HR

## 2020-01-01 PROCEDURE — 74011000250 HC RX REV CODE- 250

## 2020-01-01 PROCEDURE — 77030003704 HC NDL VASC ACC ARMD -A

## 2020-01-01 PROCEDURE — 74011000258 HC RX REV CODE- 258

## 2020-01-01 PROCEDURE — 05HY33Z INSERTION OF INFUSION DEVICE INTO UPPER VEIN, PERCUTANEOUS APPROACH: ICD-10-PCS | Performed by: EMERGENCY MEDICINE

## 2020-01-01 PROCEDURE — 86920 COMPATIBILITY TEST SPIN: CPT

## 2020-01-01 PROCEDURE — 74011000250 HC RX REV CODE- 250: Performed by: NURSE ANESTHETIST, CERTIFIED REGISTERED

## 2020-01-01 PROCEDURE — 74011000250 HC RX REV CODE- 250: Performed by: INTERNAL MEDICINE

## 2020-01-01 PROCEDURE — 86900 BLOOD TYPING SEROLOGIC ABO: CPT

## 2020-01-01 PROCEDURE — C8929 TTE W OR WO FOL WCON,DOPPLER: HCPCS

## 2020-01-01 PROCEDURE — 80076 HEPATIC FUNCTION PANEL: CPT

## 2020-01-01 PROCEDURE — 92950 HEART/LUNG RESUSCITATION CPR: CPT

## 2020-01-01 PROCEDURE — 80048 BASIC METABOLIC PNL TOTAL CA: CPT

## 2020-01-01 PROCEDURE — C1725 CATH, TRANSLUMIN NON-LASER: HCPCS

## 2020-01-01 PROCEDURE — 77010033678 HC OXYGEN DAILY

## 2020-01-01 PROCEDURE — 77030018673

## 2020-01-01 PROCEDURE — 65610000006 HC RM INTENSIVE CARE

## 2020-01-01 PROCEDURE — 76010000171 HC OR TIME 2 TO 2.5 HR INTENSV-TIER 1

## 2020-01-01 PROCEDURE — 86923 COMPATIBILITY TEST ELECTRIC: CPT

## 2020-01-01 PROCEDURE — 5A12012 PERFORMANCE OF CARDIAC OUTPUT, SINGLE, MANUAL: ICD-10-PCS | Performed by: EMERGENCY MEDICINE

## 2020-01-01 PROCEDURE — 77030019940 HC BLNKT HYPOTHRM STRY -B

## 2020-01-01 PROCEDURE — 94002 VENT MGMT INPAT INIT DAY: CPT

## 2020-01-01 PROCEDURE — 36430 TRANSFUSION BLD/BLD COMPNT: CPT

## 2020-01-01 PROCEDURE — 04V93DZ RESTRICTION OF RIGHT RENAL ARTERY WITH INTRALUMINAL DEVICE, PERCUTANEOUS APPROACH: ICD-10-PCS

## 2020-01-01 PROCEDURE — 93005 ELECTROCARDIOGRAM TRACING: CPT

## 2020-01-01 PROCEDURE — 77030019952 HC CANSTR VAC ASST KCON -B

## 2020-01-01 PROCEDURE — 85025 COMPLETE CBC W/AUTO DIFF WBC: CPT

## 2020-01-01 PROCEDURE — 77030022201 HC DRSG OPNABD ABTHRA KCON -E

## 2020-01-01 PROCEDURE — 74174 CTA ABD&PLVS W/CONTRAST: CPT

## 2020-01-01 PROCEDURE — 86901 BLOOD TYPING SEROLOGIC RH(D): CPT

## 2020-01-01 PROCEDURE — 74011250636 HC RX REV CODE- 250/636: Performed by: NURSE ANESTHETIST, CERTIFIED REGISTERED

## 2020-01-01 PROCEDURE — 71045 X-RAY EXAM CHEST 1 VIEW: CPT

## 2020-01-01 PROCEDURE — 77030018836 HC SOL IRR NACL ICUM -A

## 2020-01-01 PROCEDURE — 76060000035 HC ANESTHESIA 2 TO 2.5 HR

## 2020-01-01 PROCEDURE — 30243R1 TRANSFUSION OF NONAUTOLOGOUS PLATELETS INTO CENTRAL VEIN, PERCUTANEOUS APPROACH: ICD-10-PCS | Performed by: ANESTHESIOLOGY

## 2020-01-01 PROCEDURE — 77030005513 HC CATH URETH FOL11 MDII -B

## 2020-01-01 PROCEDURE — 96365 THER/PROPH/DIAG IV INF INIT: CPT

## 2020-01-01 PROCEDURE — P9047 ALBUMIN (HUMAN), 25%, 50ML: HCPCS | Performed by: INTERNAL MEDICINE

## 2020-01-01 PROCEDURE — 85610 PROTHROMBIN TIME: CPT

## 2020-01-01 PROCEDURE — 77030002986 HC SUT PROL J&J -A

## 2020-01-01 PROCEDURE — 83605 ASSAY OF LACTIC ACID: CPT

## 2020-01-01 PROCEDURE — 77030002996 HC SUT SLK J&J -A

## 2020-01-01 PROCEDURE — C1751 CATH, INF, PER/CENT/MIDLINE: HCPCS

## 2020-01-01 PROCEDURE — P9059 PLASMA, FRZ BETWEEN 8-24HOUR: HCPCS

## 2020-01-01 PROCEDURE — 77030013079 HC BLNKT BAIR HGGR 3M -A: Performed by: ANESTHESIOLOGY

## 2020-01-01 PROCEDURE — 94003 VENT MGMT INPAT SUBQ DAY: CPT

## 2020-01-01 PROCEDURE — 74011000258 HC RX REV CODE- 258: Performed by: NURSE ANESTHETIST, CERTIFIED REGISTERED

## 2020-01-01 PROCEDURE — 0BH17EZ INSERTION OF ENDOTRACHEAL AIRWAY INTO TRACHEA, VIA NATURAL OR ARTIFICIAL OPENING: ICD-10-PCS | Performed by: ANESTHESIOLOGY

## 2020-01-01 PROCEDURE — 74011636320 HC RX REV CODE- 636/320: Performed by: EMERGENCY MEDICINE

## 2020-01-01 PROCEDURE — 36556 INSERT NON-TUNNEL CV CATH: CPT

## 2020-01-01 PROCEDURE — P9012 CRYOPRECIPITATE EACH UNIT: HCPCS

## 2020-01-01 PROCEDURE — 86850 RBC ANTIBODY SCREEN: CPT

## 2020-01-01 PROCEDURE — 74011636320 HC RX REV CODE- 636/320

## 2020-01-01 PROCEDURE — 84484 ASSAY OF TROPONIN QUANT: CPT

## 2020-01-01 PROCEDURE — 77030031139 HC SUT VCRL2 J&J -A

## 2020-01-01 PROCEDURE — 30243K1 TRANSFUSION OF NONAUTOLOGOUS FROZEN PLASMA INTO CENTRAL VEIN, PERCUTANEOUS APPROACH: ICD-10-PCS | Performed by: ANESTHESIOLOGY

## 2020-01-01 PROCEDURE — 83690 ASSAY OF LIPASE: CPT

## 2020-01-01 PROCEDURE — 36415 COLL VENOUS BLD VENIPUNCTURE: CPT

## 2020-01-01 PROCEDURE — B4101ZZ FLUOROSCOPY OF ABDOMINAL AORTA USING LOW OSMOLAR CONTRAST: ICD-10-PCS

## 2020-01-01 PROCEDURE — 77030019563 HC DEV ATTCH FEED HOLL -A

## 2020-01-01 PROCEDURE — 77030020061 HC IV BLD WRMR ADMIN SET 3M -B: Performed by: ANESTHESIOLOGY

## 2020-01-01 PROCEDURE — 30233N1 TRANSFUSION OF NONAUTOLOGOUS RED BLOOD CELLS INTO PERIPHERAL VEIN, PERCUTANEOUS APPROACH: ICD-10-PCS | Performed by: EMERGENCY MEDICINE

## 2020-01-01 PROCEDURE — 74011250636 HC RX REV CODE- 250/636: Performed by: ANESTHESIOLOGY

## 2020-01-01 PROCEDURE — 5A1935Z RESPIRATORY VENTILATION, LESS THAN 24 CONSECUTIVE HOURS: ICD-10-PCS | Performed by: EMERGENCY MEDICINE

## 2020-01-01 PROCEDURE — 71275 CT ANGIOGRAPHY CHEST: CPT

## 2020-01-01 PROCEDURE — B4161ZZ FLUOROSCOPY OF RIGHT RENAL ARTERY USING LOW OSMOLAR CONTRAST: ICD-10-PCS

## 2020-01-01 PROCEDURE — 77030040922 HC BLNKT HYPOTHRM STRY -A

## 2020-01-01 PROCEDURE — 82803 BLOOD GASES ANY COMBINATION: CPT

## 2020-01-01 PROCEDURE — 0D9670Z DRAINAGE OF STOMACH WITH DRAINAGE DEVICE, VIA NATURAL OR ARTIFICIAL OPENING: ICD-10-PCS | Performed by: EMERGENCY MEDICINE

## 2020-01-01 PROCEDURE — 74011000250 HC RX REV CODE- 250: Performed by: ANESTHESIOLOGY

## 2020-01-01 PROCEDURE — 77030018729 HC ELECTRD DEFIB PAD CARD -B

## 2020-01-01 PROCEDURE — 77030011284 HC ELECTRD PD DEFIB 3M -A

## 2020-01-01 PROCEDURE — 77030011264 HC ELECTRD BLD EXT COVD -A

## 2020-01-01 PROCEDURE — P9035 PLATELET PHERES LEUKOREDUCED: HCPCS

## 2020-01-01 PROCEDURE — 85027 COMPLETE CBC AUTOMATED: CPT

## 2020-01-01 PROCEDURE — 77030008771 HC TU NG SALEM SUMP -A

## 2020-01-01 PROCEDURE — 30243M1 TRANSFUSION OF NONAUTOLOGOUS PLASMA CRYOPRECIPITATE INTO CENTRAL VEIN, PERCUTANEOUS APPROACH: ICD-10-PCS | Performed by: INTERNAL MEDICINE

## 2020-01-01 PROCEDURE — 74011000250 HC RX REV CODE- 250: Performed by: EMERGENCY MEDICINE

## 2020-01-01 PROCEDURE — 82533 TOTAL CORTISOL: CPT

## 2020-01-01 PROCEDURE — 77030011640 HC PAD GRND REM COVD -A

## 2020-01-01 RX ORDER — MORPHINE SULFATE 4 MG/ML
5 INJECTION INTRAVENOUS
Status: DISCONTINUED | OUTPATIENT
Start: 2020-01-01 | End: 2020-04-17 | Stop reason: HOSPADM

## 2020-01-01 RX ORDER — SODIUM CHLORIDE 9 MG/ML
250 INJECTION, SOLUTION INTRAVENOUS AS NEEDED
Status: DISCONTINUED | OUTPATIENT
Start: 2020-01-01 | End: 2020-01-01 | Stop reason: SDUPTHER

## 2020-01-01 RX ORDER — SODIUM CHLORIDE 9 MG/ML
250 INJECTION, SOLUTION INTRAVENOUS AS NEEDED
Status: DISCONTINUED | OUTPATIENT
Start: 2020-01-01 | End: 2020-01-01

## 2020-01-01 RX ORDER — SODIUM BICARBONATE 1 MEQ/ML
SYRINGE (ML) INTRAVENOUS AS NEEDED
Status: DISCONTINUED | OUTPATIENT
Start: 2020-01-01 | End: 2020-01-01 | Stop reason: HOSPADM

## 2020-01-01 RX ORDER — SODIUM BICARBONATE 84 MG/ML
100 INJECTION, SOLUTION INTRAVENOUS ONCE
Status: COMPLETED | OUTPATIENT
Start: 2020-01-01 | End: 2020-01-01

## 2020-01-01 RX ORDER — SODIUM BICARBONATE 84 MG/ML
50 INJECTION, SOLUTION INTRAVENOUS ONCE
Status: COMPLETED | OUTPATIENT
Start: 2020-01-01 | End: 2020-01-01

## 2020-01-01 RX ORDER — ALBUMIN HUMAN 250 G/1000ML
12.5 SOLUTION INTRAVENOUS EVERY 6 HOURS
Status: DISCONTINUED | OUTPATIENT
Start: 2020-01-01 | End: 2020-04-17 | Stop reason: HOSPADM

## 2020-01-01 RX ORDER — CALCIUM GLUCONATE 20 MG/ML
INJECTION, SOLUTION INTRAVENOUS AS NEEDED
Status: DISCONTINUED | OUTPATIENT
Start: 2020-01-01 | End: 2020-01-01 | Stop reason: HOSPADM

## 2020-01-01 RX ORDER — SODIUM BICARBONATE 84 MG/ML
INJECTION, SOLUTION INTRAVENOUS
Status: COMPLETED
Start: 2020-01-01 | End: 2020-01-01

## 2020-01-01 RX ORDER — SODIUM CHLORIDE 0.9 % (FLUSH) 0.9 %
5-40 SYRINGE (ML) INJECTION EVERY 8 HOURS
Status: DISCONTINUED | OUTPATIENT
Start: 2020-01-01 | End: 2020-04-17 | Stop reason: HOSPADM

## 2020-01-01 RX ORDER — SODIUM CHLORIDE 9 MG/ML
250 INJECTION, SOLUTION INTRAVENOUS AS NEEDED
Status: DISCONTINUED | OUTPATIENT
Start: 2020-01-01 | End: 2020-04-17 | Stop reason: HOSPADM

## 2020-01-01 RX ORDER — CALCIUM CHLORIDE INJECTION 100 MG/ML
1 INJECTION, SOLUTION INTRAVENOUS ONCE
Status: COMPLETED | OUTPATIENT
Start: 2020-01-01 | End: 2020-01-01

## 2020-01-01 RX ORDER — HYDROCORTISONE SODIUM SUCCINATE 100 MG/2ML
100 INJECTION, POWDER, FOR SOLUTION INTRAMUSCULAR; INTRAVENOUS EVERY 8 HOURS
Status: DISCONTINUED | OUTPATIENT
Start: 2020-01-01 | End: 2020-04-17 | Stop reason: HOSPADM

## 2020-01-01 RX ORDER — ETOMIDATE 2 MG/ML
INJECTION INTRAVENOUS
Status: COMPLETED | OUTPATIENT
Start: 2020-01-01 | End: 2020-01-01

## 2020-01-01 RX ORDER — MIDAZOLAM HYDROCHLORIDE 1 MG/ML
INJECTION, SOLUTION INTRAMUSCULAR; INTRAVENOUS AS NEEDED
Status: DISCONTINUED | OUTPATIENT
Start: 2020-01-01 | End: 2020-01-01 | Stop reason: HOSPADM

## 2020-01-01 RX ORDER — ROCURONIUM BROMIDE 10 MG/ML
INJECTION, SOLUTION INTRAVENOUS AS NEEDED
Status: DISCONTINUED | OUTPATIENT
Start: 2020-01-01 | End: 2020-01-01 | Stop reason: HOSPADM

## 2020-01-01 RX ORDER — SODIUM CHLORIDE, SODIUM LACTATE, POTASSIUM CHLORIDE, CALCIUM CHLORIDE 600; 310; 30; 20 MG/100ML; MG/100ML; MG/100ML; MG/100ML
100 INJECTION, SOLUTION INTRAVENOUS CONTINUOUS
Status: DISCONTINUED | OUTPATIENT
Start: 2020-01-01 | End: 2020-01-01

## 2020-01-01 RX ORDER — SODIUM CHLORIDE 0.9 % (FLUSH) 0.9 %
5-40 SYRINGE (ML) INJECTION AS NEEDED
Status: DISCONTINUED | OUTPATIENT
Start: 2020-01-01 | End: 2020-04-17 | Stop reason: HOSPADM

## 2020-01-01 RX ORDER — CEFAZOLIN SODIUM 1 G/3ML
INJECTION, POWDER, FOR SOLUTION INTRAMUSCULAR; INTRAVENOUS AS NEEDED
Status: DISCONTINUED | OUTPATIENT
Start: 2020-01-01 | End: 2020-01-01 | Stop reason: HOSPADM

## 2020-01-01 RX ORDER — LORAZEPAM 2 MG/ML
INJECTION INTRAMUSCULAR
Status: COMPLETED
Start: 2020-01-01 | End: 2020-01-01

## 2020-01-01 RX ORDER — FENTANYL CITRATE 50 UG/ML
50 INJECTION, SOLUTION INTRAMUSCULAR; INTRAVENOUS
Status: COMPLETED | OUTPATIENT
Start: 2020-01-01 | End: 2020-01-01

## 2020-01-01 RX ORDER — SUCCINYLCHOLINE CHLORIDE 20 MG/ML
INJECTION INTRAMUSCULAR; INTRAVENOUS
Status: COMPLETED | OUTPATIENT
Start: 2020-01-01 | End: 2020-01-01

## 2020-01-01 RX ORDER — CALCIUM CHLORIDE INJECTION 100 MG/ML
INJECTION, SOLUTION INTRAVENOUS AS NEEDED
Status: DISCONTINUED | OUTPATIENT
Start: 2020-01-01 | End: 2020-01-01 | Stop reason: HOSPADM

## 2020-01-01 RX ORDER — LORAZEPAM 2 MG/ML
2 INJECTION INTRAMUSCULAR
Status: DISCONTINUED | OUTPATIENT
Start: 2020-01-01 | End: 2020-04-17 | Stop reason: HOSPADM

## 2020-01-01 RX ADMIN — EPINEPHRINE 70 MCG/MIN: 1 INJECTION INTRAMUSCULAR; INTRAVENOUS; SUBCUTANEOUS at 15:21

## 2020-01-01 RX ADMIN — PIPERACILLIN AND TAZOBACTAM 3.38 G: 3; .375 INJECTION, POWDER, LYOPHILIZED, FOR SOLUTION INTRAVENOUS at 04:00

## 2020-01-01 RX ADMIN — SODIUM CHLORIDE 1000 ML: 900 INJECTION, SOLUTION INTRAVENOUS at 21:15

## 2020-01-01 RX ADMIN — PERFLUTREN 2 ML: 6.52 INJECTION, SUSPENSION INTRAVENOUS at 11:27

## 2020-01-01 RX ADMIN — PHENYLEPHRINE HYDROCHLORIDE 300 MCG/MIN: 10 INJECTION INTRAVENOUS at 07:29

## 2020-01-01 RX ADMIN — SODIUM BICARBONATE: 84 INJECTION, SOLUTION INTRAVENOUS at 12:35

## 2020-01-01 RX ADMIN — VASOPRESSIN 0.04 UNITS/MIN: 20 INJECTION INTRAVENOUS at 19:04

## 2020-01-01 RX ADMIN — EPINEPHRINE 60 MCG/MIN: 1 INJECTION INTRAMUSCULAR; INTRAVENOUS; SUBCUTANEOUS at 10:07

## 2020-01-01 RX ADMIN — SODIUM BICARBONATE 50 MEQ: 84 INJECTION, SOLUTION INTRAVENOUS at 13:30

## 2020-01-01 RX ADMIN — VASOPRESSIN 0.04 UNITS/MIN: 20 INJECTION INTRAVENOUS at 03:36

## 2020-01-01 RX ADMIN — SODIUM BICARBONATE 50 MEQ: 84 INJECTION, SOLUTION INTRAVENOUS at 18:16

## 2020-01-01 RX ADMIN — HYDROCORTISONE SODIUM SUCCINATE 100 MG: 100 INJECTION, POWDER, FOR SOLUTION INTRAMUSCULAR; INTRAVENOUS at 06:18

## 2020-01-01 RX ADMIN — SODIUM CHLORIDE, SODIUM LACTATE, POTASSIUM CHLORIDE, AND CALCIUM CHLORIDE 1000 ML: 600; 310; 30; 20 INJECTION, SOLUTION INTRAVENOUS at 14:59

## 2020-01-01 RX ADMIN — ALBUMIN (HUMAN) 12.5 G: 0.25 INJECTION, SOLUTION INTRAVENOUS at 11:59

## 2020-01-01 RX ADMIN — EPINEPHRINE 65 MCG/MIN: 1 INJECTION INTRAMUSCULAR; INTRAVENOUS; SUBCUTANEOUS at 12:41

## 2020-01-01 RX ADMIN — SUCCINYLCHOLINE CHLORIDE 140 MG: 20 INJECTION, SOLUTION INTRAMUSCULAR; INTRAVENOUS; PARENTERAL at 23:42

## 2020-01-01 RX ADMIN — DEXTROSE 200 MCG/MIN: 5 SOLUTION INTRAVENOUS at 15:08

## 2020-01-01 RX ADMIN — PHENYLEPHRINE HYDROCHLORIDE 300 MCG/MIN: 10 INJECTION INTRAVENOUS at 03:30

## 2020-01-01 RX ADMIN — ROCURONIUM BROMIDE 50 MG: 50 INJECTION, SOLUTION INTRAVENOUS at 00:39

## 2020-01-01 RX ADMIN — MORPHINE SULFATE 5 MG: 4 INJECTION INTRAVENOUS at 20:50

## 2020-01-01 RX ADMIN — PHENYLEPHRINE HYDROCHLORIDE 125 MCG/MIN: 10 INJECTION INTRAVENOUS at 00:44

## 2020-01-01 RX ADMIN — DEXTROSE 200 MCG/MIN: 5 SOLUTION INTRAVENOUS at 12:36

## 2020-01-01 RX ADMIN — SODIUM CHLORIDE, SODIUM LACTATE, POTASSIUM CHLORIDE, AND CALCIUM CHLORIDE 100 ML/HR: 600; 310; 30; 20 INJECTION, SOLUTION INTRAVENOUS at 03:17

## 2020-01-01 RX ADMIN — LORAZEPAM 2 MG: 2 INJECTION INTRAMUSCULAR; INTRAVENOUS at 23:39

## 2020-01-01 RX ADMIN — Medication 10 ML: at 14:00

## 2020-01-01 RX ADMIN — FENTANYL CITRATE 50 MCG: 50 INJECTION INTRAMUSCULAR; INTRAVENOUS at 21:15

## 2020-01-01 RX ADMIN — EPINEPHRINE 60 MCG/MIN: 1 INJECTION INTRAMUSCULAR; INTRAVENOUS; SUBCUTANEOUS at 00:44

## 2020-01-01 RX ADMIN — MORPHINE SULFATE 4 MG: 4 INJECTION INTRAVENOUS at 21:05

## 2020-01-01 RX ADMIN — SODIUM BICARBONATE 50 MEQ: 84 INJECTION INTRAVENOUS at 02:04

## 2020-01-01 RX ADMIN — EPINEPHRINE 50 MCG/MIN: 1 INJECTION INTRAMUSCULAR; INTRAVENOUS; SUBCUTANEOUS at 04:40

## 2020-01-01 RX ADMIN — IOPAMIDOL 100 ML: 755 INJECTION, SOLUTION INTRAVENOUS at 22:06

## 2020-01-01 RX ADMIN — NOREPINEPHRINE BITARTRATE 30 MCG/MIN: 1 INJECTION, SOLUTION, CONCENTRATE INTRAVENOUS at 03:17

## 2020-01-01 RX ADMIN — SODIUM CHLORIDE, POTASSIUM CHLORIDE, SODIUM LACTATE AND CALCIUM CHLORIDE 1000 ML: 600; 310; 30; 20 INJECTION, SOLUTION INTRAVENOUS at 15:00

## 2020-01-01 RX ADMIN — DEXTROSE 200 MCG/MIN: 5 SOLUTION INTRAVENOUS at 17:55

## 2020-01-01 RX ADMIN — CEFAZOLIN SODIUM 2 G: 1 INJECTION, POWDER, FOR SOLUTION INTRAMUSCULAR; INTRAVENOUS at 01:07

## 2020-01-01 RX ADMIN — SODIUM BICARBONATE 100 MEQ: 84 INJECTION, SOLUTION INTRAVENOUS at 04:46

## 2020-01-01 RX ADMIN — EPINEPHRINE 10 MCG/MIN: 1 INJECTION INTRAMUSCULAR; INTRAVENOUS; SUBCUTANEOUS at 03:17

## 2020-01-01 RX ADMIN — ALBUMIN (HUMAN) 12.5 G: 0.25 INJECTION, SOLUTION INTRAVENOUS at 16:55

## 2020-01-01 RX ADMIN — SODIUM BICARBONATE 100 MEQ: 84 INJECTION, SOLUTION INTRAVENOUS at 03:00

## 2020-01-01 RX ADMIN — ROCURONIUM BROMIDE 50 MG: 50 INJECTION, SOLUTION INTRAVENOUS at 07:48

## 2020-01-01 RX ADMIN — LORAZEPAM 2 MG: 2 INJECTION INTRAMUSCULAR; INTRAVENOUS at 20:55

## 2020-01-01 RX ADMIN — SODIUM BICARBONATE: 84 INJECTION, SOLUTION INTRAVENOUS at 04:00

## 2020-01-01 RX ADMIN — SODIUM BICARBONATE 50 MEQ: 84 INJECTION, SOLUTION INTRAVENOUS at 13:29

## 2020-01-01 RX ADMIN — LORAZEPAM 2 MG: 2 INJECTION INTRAMUSCULAR; INTRAVENOUS at 21:05

## 2020-01-01 RX ADMIN — PHENYLEPHRINE HYDROCHLORIDE 350 MCG/MIN: 10 INJECTION INTRAVENOUS at 13:18

## 2020-01-01 RX ADMIN — PHENYLEPHRINE HYDROCHLORIDE 300 MCG/MIN: 10 INJECTION INTRAVENOUS at 18:08

## 2020-01-01 RX ADMIN — CALCIUM CHLORIDE 1 G: 100 INJECTION INTRAVENOUS; INTRAVENTRICULAR at 05:00

## 2020-01-01 RX ADMIN — MIDAZOLAM HYDROCHLORIDE 5 MG: 1 INJECTION, SOLUTION INTRAMUSCULAR; INTRAVENOUS at 07:48

## 2020-01-01 RX ADMIN — Medication 10 ML: at 05:01

## 2020-01-01 RX ADMIN — PHYTONADIONE 10 MG: 10 INJECTION, EMULSION INTRAMUSCULAR; INTRAVENOUS; SUBCUTANEOUS at 22:44

## 2020-01-01 RX ADMIN — CALCIUM GLUCONATE 1 G: 20 INJECTION, SOLUTION INTRAVENOUS at 08:18

## 2020-01-01 RX ADMIN — SODIUM CHLORIDE 40 MG: 9 INJECTION INTRAMUSCULAR; INTRAVENOUS; SUBCUTANEOUS at 05:00

## 2020-01-01 RX ADMIN — CALCIUM CHLORIDE 1 G: 100 INJECTION INTRAVENOUS; INTRAVENTRICULAR at 02:04

## 2020-01-01 RX ADMIN — PIPERACILLIN AND TAZOBACTAM 3.38 G: 3; .375 INJECTION, POWDER, LYOPHILIZED, FOR SOLUTION INTRAVENOUS at 12:20

## 2020-01-01 RX ADMIN — PHENYLEPHRINE HYDROCHLORIDE 300 MCG/MIN: 10 INJECTION INTRAVENOUS at 03:16

## 2020-01-01 RX ADMIN — EPINEPHRINE 75 MCG/MIN: 1 INJECTION INTRAMUSCULAR; INTRAVENOUS; SUBCUTANEOUS at 17:55

## 2020-01-01 RX ADMIN — ETOMIDATE 20 MG: 2 INJECTION, SOLUTION INTRAVENOUS at 23:40

## 2020-01-01 RX ADMIN — ALBUMIN (HUMAN) 12.5 G: 0.25 INJECTION, SOLUTION INTRAVENOUS at 04:49

## 2020-01-01 RX ADMIN — SODIUM CHLORIDE 40 MG: 9 INJECTION INTRAMUSCULAR; INTRAVENOUS; SUBCUTANEOUS at 16:55

## 2020-01-01 RX ADMIN — NOREPINEPHRINE BITARTRATE 30 MCG/MIN: 1 INJECTION, SOLUTION, CONCENTRATE INTRAVENOUS at 04:00

## 2020-01-01 RX ADMIN — HYDROCORTISONE SODIUM SUCCINATE 100 MG: 100 INJECTION, POWDER, FOR SOLUTION INTRAMUSCULAR; INTRAVENOUS at 13:26

## 2020-04-16 PROBLEM — I71.9 AORTIC ANEURYSM (HCC): Status: ACTIVE | Noted: 2020-01-01

## 2020-04-16 NOTE — ANESTHESIA PREPROCEDURE EVALUATION
Relevant Problems No relevant active problems Anesthetic History No history of anesthetic complications Review of Systems / Medical History Patient summary reviewed, nursing notes reviewed and pertinent labs reviewed Pulmonary Within defined limits Neuro/Psych Within defined limits Cardiovascular Within defined limits Exercise tolerance: >4 METS 
  
GI/Hepatic/Renal 
Within defined limits Endo/Other Within defined limits Arthritis Other Findings Comments: EtOH abuse with overt signs of jaundice Physical Exam 
 
Airway Mallampati: II 
 
Neck ROM: normal range of motion Mouth opening: Normal 
 
 Cardiovascular Regular rate and rhythm,  S1 and S2 normal,  no murmur, click, rub, or gallop Rhythm: regular Rate: normal 
 
 
 
 Dental 
No notable dental hx Pulmonary Breath sounds clear to auscultation Abdominal 
GI exam deferred Other Findings Anesthetic Plan ASA: 4, emergent Anesthesia type: general 
 
Monitoring Plan: Arterial line and CVP Anesthetic plan and risks discussed with: Patient

## 2020-04-16 NOTE — PERIOP NOTES
9680 - Pt transported to ICU bed 10 with Dr Reji Yuan, [address] [place] [address] staff, this RN and Umu Mendez. Dr Reji Yuan at bedside for report. Pt with C/D/I dressing to left femoral area, CVL present to Right femoral, distal port being used. CRNA push/pulling platelets. Other staff squeezing Levo/Epi. Area of CVL insertion with bruising and swelling, Dr Nataly Rossi and Dr Reji Yuan aware. Pt with 20 G AC lines x 3, lowe patent and draining markie urine. RT at bedside, pt placed on vent. ICU RN Campos Vázquez at bedside, report given. 46 - ICU RN Campos Vázquez at bedside, report given, pt care transferred at this time. 3210 - DR Reji Yuan back to bedside. 12 - Family updated at this time and remains in ICU lobby, primary MEE Vázquez aware.

## 2020-04-16 NOTE — ED NOTES
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 Per EMS patient received 3 units of uncrossmatched blood, per EMS PLT 79. Patient denies blood thinners but INR is 2.5. Patient became hypotensive in CT, MAP of 40. Per EMS patient has received 2L of NS, EMS gave vitamin K and 480mg of Neomycin.  Patients on 2L NC.

## 2020-04-16 NOTE — PROGRESS NOTES
Request by  for visit with wife and follow-up with her request for Sacrament of [address] for Mr. Sierra. .Talked with her about his condition. Message left for Fr Haley who is offering Mass today for the Sacrament of the [address] .  Plan is to follow-up with wife for support. SHAYY Kennedy, RN, ACSW, LCSW  Page:  [phone])

## 2020-04-16 NOTE — ANESTHESIA POSTPROCEDURE EVALUATION
Procedure(s): EXPLORATORY LAPAROTOMY. No value filed. Anesthesia Post Evaluation Patient location during evaluation: ICU Pain management: adequate Airway patency: patent Anesthetic complications: no 
Cardiovascular status: hypotensive Respiratory status: acceptable Hydration status: acceptable Vitals Value Taken Time BP 80/53 4/16/2020  9:50 AM  
Temp 32.8 °C (91 °F) 4/16/2020  9:50 AM  
Pulse 127 4/16/2020  9:54 AM  
Resp 0 4/16/2020  9:54 AM  
SpO2 93 % 4/16/2020  9:50 AM  
Vitals shown include unvalidated device data.

## 2020-04-16 NOTE — H&P
Yvan Mon[facility] [place] [address] HISTORY AND PHYSICAL Name:  Kaushik Arriaga 
MR#:  [id number] :  1960 ACCOUNT #:  [de-identified] ADMIT DATE:  04/15/2020 CHIEF COMPLAINT:  Ruptured right renal artery aneurysm. HISTORY OF PRESENT ILLNESS:  The patient is a 19-year-old male with history of liver dysfunction and alcoholism. He presented to the [facility] emergency room with dizziness and abdominal pain. CT examination revealed a ruptured renal artery aneurysm. He was transferred to [facility] for evaluation and treatment of same. During the course of the exam, the patient lost consciousness and was unable to provide meaningful history or review of systems. PAST MEDICAL HISTORY:  Liver dysfunction, alcoholism. ALLERGIES:  NO KNOWN DRUG ALLERGIES. SOCIAL HISTORY:  He drinks daily. He does not smoke, does not use illicit drugs. FAMILY HISTORY:  Noncontributory. REVIEW OF SYSTEMS:  Not available secondary to patient's consciousness. PHYSICAL EXAMINATION: 
GENERAL:  A middle-aged male, jaundiced, in acute distress secondary to hypertension and abdominal distension. HEENT:  Sclerae are icteric. NECK:  Supple. LUNGS:  Clear. HEART:  Regular rate and rhythm. ABDOMEN:  Soft, slightly distended, and is apparently tender. PERTINENT LABORATORY DATA AND X-RAYS:  Platelet count is 79, lactic acid is 5.2, INR is 2.5, hemoglobin is 11.4. ASSESSMENT AND PLAN:  A middle-aged male with a history of liver dysfunction and ruptured renal artery aneurysm. During the course of his evaluation, he had an episode of pulseless electrical activity. We will take him to the operating room emergently for attempted control of the bleeding. Survival of this is unlikely and has been discussed in detail with the wife and family. We will make further plans pending results of surgery.  
 
 
MD BERENICE Grimes/[email or web address]/[email or web address] 
D:  2020 7:47 
T:  2020 12:05 
 JOB #:  [id number]

## 2020-04-16 NOTE — ED NOTES
ROSC occurred. Dr. Tianna Luther, Vascular Surgeon, Dr. Mei Bray at bedside. Vascular surgeon placing central line, Dr. Tianna Luther attempting intubation

## 2020-04-16 NOTE — CONSULTS
Yvan Monroy luis [place] [address] YOB: 1960 Assessment & Plan: 1. DUNCAN: ATN, Shock, s/p 2 contrasts and Renal artery embolization 4 16 20 
· Oliguric · K acceptable · Severe Acidosis: lactic acidosis · He is heading towards CRRT but on 4 pressures BP is 60s. Needs better BP, Will dw family 2. Shock: 4 pressures 3. Anemia 4. S/P Emergent RT Renal artery coil embolization for ruptured aneurysm 5. PEA 4 15 20 
6. DIC 7. Resp failure: vent Subjective: CHIEF COMPLAIN:arf HPI: 
We are seeing Mr. Sierra for DUNCNA. Cr worse from 1.1 to 1.65 mg. He has right RP hematoma with ruptured right renal artery. He underwent  Emergent RT Renal artery coil embolization for ruptured aneurysm He is on vent,100% 4 pressures Not making urine. Getting Blood. He had ex lap this am for abd compartment. CT abd showed a large right RP hematoma with ruptured right renal artery. Pt then proceeded to have coffee ground emesis in ED and arrested ( PEA per ICU RN) and ROSC < 8 min Review of Systems Review of systems not obtained due to patient factors. Past Medical History:  
Diagnosis Date  Rheumatoid arthritis(714.0) as child/20's  Trauma History reviewed. No pertinent surgical history. Social History Socioeconomic History  Marital status:  Spouse name: Not on file  Number of children: Not on file  Years of education: Not on file  Highest education level: Not on file Occupational History  Not on file Social Needs  Financial resource strain: Not on file  Food insecurity Worry: Not on file Inability: Not on file  Transportation needs Medical: Not on file Non-medical: Not on file Tobacco Use  Smoking status: Never Smoker  Smokeless tobacco: Never Used Substance and Sexual Activity  Alcohol use: Yes Alcohol/week: 0.0 standard drinks Types: 2 - 3 Cans of beer per week Comment: occasionally  Drug use: No  
 Sexual activity: Yes  
  Partners: Female Lifestyle  Physical activity Days per week: Not on file Minutes per session: Not on file  Stress: Not on file Relationships  Social connections Talks on phone: Not on file Gets together: Not on file Attends [employer] service: Not on file Active member of club or organization: Not on file Attends meetings of clubs or organizations: Not on file Relationship status: Not on file  Intimate partner violence Fear of current or ex partner: Not on file Emotionally abused: Not on file Physically abused: Not on file Forced sexual activity: Not on file Other Topics Concern  Not on file Social History Narrative  Not on file Family History Problem Relation Age of Onset  Depression Mother  Stroke Father 80  
 Cancer Father 80  Cancer Brother 46  
     prostate Prior to Admission medications Not on File No Known Allergies Objective:  
 
Vitals: 
Blood pressure (!) 80/53, pulse (!) 128, temperature (!) 91 °F (32.8 °C), resp. rate 30, height 6' 4\" (1.93 m), weight 99.8 kg (220 lb), SpO2 93 %. Temp (24hrs), Av.6 °F (33.7 °C), Min:90.1 °F (32.3 °C), Max:97.6 °F (36.4 °C) Intake and Output: 
 07 -  1900 In: 829.8 [I.V.:209.8] Out: 1000 [Drains:400]  190 -  0700 In: 4491.3 [I.V.:2245.8] Out: 90 [Urine:90] Physical Exam:              
 Patient is intubated:  yes Physical Examination:  
GENERAL ASSESSMENT: obese SKIN: dry skin HEAD: normocephalic EYES: normal eyes NECK: intubated, lines CHEST: vent HEART: sinus arrhythmia ABDOMEN: soft :Toro: yes EXTREMITY: no joint swelling NEURO: sedated ECG/rhythm[de-identified] Rev:yes Xray/CT/US/MRI REV:yes Data Review Recent Results (from the past 72 hour(s)) CBC WITH AUTOMATED DIFF  
 Collection Time: 04/15/20  9:13 PM  
Result Value Ref Range WBC 9.2 4.1 - 11.1 K/uL  
 RBC 2.98 (L) 4.10 - 5.70 M/uL  
 HGB 11.4 (L) 12.1 - 17.0 g/dL HCT 32.9 (L) 36.6 - 50.3 % .4 (H) 80.0 - 99.0 FL  
 MCH 38.3 (H) 26.0 - 34.0 PG  
 MCHC 34.7 30.0 - 36.5 g/dL  
 RDW 18.4 (H) 11.5 - 14.5 % PLATELET 79 (L) 499 - 400 K/uL MPV 13.0 (H) 8.9 - 12.9 FL  
 NRBC 0.4 (H) 0.0  WBC ABSOLUTE NRBC 0.04 (H) 0.00 - 0.01 K/uL NEUTROPHILS 62 32 - 75 % BAND NEUTROPHILS 3 0 - 6 % LYMPHOCYTES 25 12 - 49 % MONOCYTES 7 5 - 13 % EOSINOPHILS 2 0 - 7 % BASOPHILS 0 0 - 1 % METAMYELOCYTES 1 (H) 0 % IMMATURE GRANULOCYTES 0 %  
 ABS. NEUTROPHILS 6.0 1.8 - 8.0 K/UL  
 ABS. LYMPHOCYTES 2.3 0.8 - 3.5 K/UL  
 ABS. MONOCYTES 0.6 0.0 - 1.0 K/UL  
 ABS. EOSINOPHILS 0.2 0.0 - 0.4 K/UL  
 ABS. BASOPHILS 0.0 0.0 - 0.1 K/UL  
 ABS. IMM. GRANS. 0.0 K/UL  
 DF MANUAL    
 RBC COMMENTS ANISOCYTOSIS 1+ 
    
 RBC COMMENTS MACROCYTOSIS 
PRESENT 
    
 RBC COMMENTS DAVEY CELLS 
PRESENT 
    
 RBC COMMENTS TARGET CELLS 2+ WBC COMMENTS TOXIC GRANULATION    
METABOLIC PANEL, BASIC Collection Time: 04/15/20  9:13 PM  
Result Value Ref Range Sodium 137 136 - 145 mmol/L Potassium 3.6 3.5 - 5.1 mmol/L Chloride 100 97 - 108 mmol/L  
 CO2 26 21 - 32 mmol/L Anion gap 11 5 - 15 mmol/L Glucose 180 (H) 65 - 100 mg/dL BUN 4 (L) 6 - 20 MG/DL Creatinine 1.10 0.70 - 1.30 MG/DL  
 BUN/Creatinine ratio 4 (L) 12 - 20 GFR est AA >60 >60 ml/min/1.73m2 GFR est non-AA >60 >60 ml/min/1.73m2 Calcium 8.1 (L) 8.5 - 10.1 MG/DL  
TROPONIN I Collection Time: 04/15/20  9:13 PM  
Result Value Ref Range Troponin-I, Qt. <0.05 <0.05 ng/mL LIPASE Collection Time: 04/15/20  9:13 PM  
Result Value Ref Range Lipase 310 73 - 393 U/L  
LACTIC ACID Collection Time: 04/15/20  9:13 PM  
Result Value Ref Range  Lactic acid 5.2 (HH) 0.4 - 2.0 MMOL/L  
HEPATIC FUNCTION PANEL  
 Collection Time: 04/15/20  9:13 PM  
Result Value Ref Range Protein, total 6.3 (L) 6.4 - 8.2 g/dL Albumin 2.3 (L) 3.5 - 5.0 g/dL Globulin 4.0 2.0 - 4.0 g/dL A-G Ratio 0.6 (L) 1.1 - 2.2 Bilirubin, total 6.5 (H) 0.2 - 1.0 MG/DL Bilirubin, direct 3.5 (H) 0.0 - 0.2 MG/DL Alk. phosphatase 124 (H) 45 - 117 U/L  
 AST (SGOT) 153 (H) 15 - 37 U/L  
 ALT (SGPT) 48 12 - 78 U/L  
PROTHROMBIN TIME + INR Collection Time: 04/15/20  9:13 PM  
Result Value Ref Range INR 2.5 (H) 0.9 - 1.1 Prothrombin time 23.1 (H) 9.0 - 11.1 sec SAMPLES BEING HELD Collection Time: 04/15/20  9:18 PM  
Result Value Ref Range SAMPLES BEING HELD 1 RED 1 SST   
 COMMENT Add-on orders for these samples will be processed based on acceptable specimen integrity and analyte stability, which may vary by analyte. EMERGENT RELEASE OF UNCROSSMATCHED RED CELLS Collection Time: 04/15/20 10:22 PM  
Result Value Ref Range Crossmatch Expiration 04/18/2020 ABO/Rh(D) A POSITIVE Antibody screen NEG Unit number [id number] Blood component type RC LR Unit division 00 Status of unit TRANSFUSED Crossmatch result Compatible Unit number [id number] Blood component type RC LR,2 Unit division 00 Status of unit TRANSFUSED Crossmatch result Compatible Unit number [id number] Blood component type RC LR Unit division 00 Status of unit TRANSFUSED Crossmatch result Compatible Unit number [id number] Blood component type RC LR,2 Unit division 00 Status of unit REL FROM [facility] Crossmatch result Emergency Release Unit number [id number] Blood component type RC LR,1 Unit division 00 Status of unit REL FROM [facility] Crossmatch result Emergency Release Unit number [id number] Blood component type RC LR,1 Unit division 00 Status of unit REL FROM [facility] Crossmatch result Emergency Release Unit number [id number] Blood component type RC LR Unit division 00 Status of unit ISSUED Crossmatch result Compatible Unit number [id number] Blood component type RC LR Unit division 00 Status of unit ISSUED Crossmatch result Compatible Unit number [id number] Blood component type RC LR Unit division 00 Status of unit ISSUED Crossmatch result Compatible Unit number [id number] Blood component type RC LR,1 Unit division 00 Status of unit TRANSFUSED Crossmatch result Compatible Unit number [id number] Blood component type RC LR Unit division 00 Status of unit ISSUED Crossmatch result Compatible Unit number [id number] Blood component type RC LR Unit division 00 Status of unit ISSUED Crossmatch result Compatible Unit number [id number] Blood component type RC LR Unit division 00 Status of unit ISSUED Crossmatch result Compatible Unit number [id number] Blood component type RC LR Unit division 00 Status of unit ISSUED Crossmatch result Not required Unit number [id number] Blood component type RC LR Unit division 00 Status of unit ISSUED Crossmatch result Not required Unit number [id number] Blood component type RC LR Unit division 00 Status of unit ISSUED Crossmatch result Not required Unit number [id number] Blood component type RC LR Unit division 00 Status of unit ISSUED Crossmatch result Not required Unit number [id number] Blood component type RC LR Unit division 00 Status of unit ALLOCATED Crossmatch result Not required Unit number [id number] Blood component type RC LR Unit division 00 Status of unit ALLOCATED Crossmatch result Not required Unit number [id number] Blood component type RC LR Unit division 00 Status of unit ALLOCATED Crossmatch result Not required SAMPLES BEING HELD Collection Time: 04/15/20 10:22 PM  
Result Value Ref Range SAMPLES BEING HELD 1 PINK COMMENT Add-on orders for these samples will be processed based on acceptable specimen integrity and analyte stability, which may vary by analyte. PLATELETS, ALLOCATE Collection Time: 04/16/20 12:45 AM  
Result Value Ref Range Unit number [id number] Blood component type PLTPH LR,2 Unit division 00 Status of unit ISSUED Unit number [id number] Blood component type PLTPH LR,2 Unit division 00 Status of unit ISSUED   
CBC W/O DIFF Collection Time: 04/16/20  1:15 AM  
Result Value Ref Range WBC 11.1 4.1 - 11.1 K/uL  
 RBC 2.18 (L) 4.10 - 5.70 M/uL HGB 7.1 (L) 12.1 - 17.0 g/dL HCT 23.5 (L) 36.6 - 50.3 % .8 (H) 80.0 - 99.0 FL  
 MCH 32.6 26.0 - 34.0 PG  
 MCHC 30.2 30.0 - 36.5 g/dL RDW 22.4 (H) 11.5 - 14.5 % PLATELET 23 (LL) 594 - 400 K/uL NRBC 2.4 (H) 0  WBC ABSOLUTE NRBC 0.26 (H) 0.00 - 0.01 K/uL COAGULATION SCREEN Collection Time: 04/16/20  1:15 AM  
Result Value Ref Range Fibrinogen <60 (LL) 200 - 475 mg/dL INR >13.7 (HH) 0.9 - 1.1 Prothrombin time >120.0 (H) 9.0 - 11.1 sec  
 aPTT >130.0 (HH) 22.1 - 32.0 sec  
 aPTT, therapeutic range     58.0 - 64.6 SECS  
METABOLIC PANEL, BASIC Collection Time: 04/16/20  1:15 AM  
Result Value Ref Range Sodium 145 136 - 145 mmol/L Potassium 4.6 3.5 - 5.1 mmol/L Chloride 115 (H) 97 - 108 mmol/L  
 CO2 12 (LL) 21 - 32 mmol/L Anion gap 18 (H) 5 - 15 mmol/L Glucose 171 (H) 65 - 100 mg/dL BUN 5 (L) 6 - 20 MG/DL Creatinine 1.35 (H) 0.70 - 1.30 MG/DL  
 BUN/Creatinine ratio 4 (L) 12 - 20 GFR est AA >60 >60 ml/min/1.73m2 GFR est non-AA 54 (L) >60 ml/min/1.73m2 Calcium 6.2 (LL) 8.5 - 10.1 MG/DL  
SAMPLES BEING HELD Collection Time: 04/16/20  1:15 AM  
Result Value Ref Range SAMPLES BEING HELD 1RED COMMENT Add-on orders for these samples will be processed based on acceptable specimen integrity and analyte stability, which may vary by analyte. PATHOLOGIST REVIEW Collection Time: 04/16/20  1:15 AM  
Result Value Ref Range Pathologist review (NOTE) BLOOD GAS, ARTERIAL Collection Time: 04/16/20  1:35 AM  
Result Value Ref Range  
 pH 6.87 (LL) 7.35 - 7.45    
 PCO2 58 (H) 35.0 - 45.0 mmHg PO2 204 (H) 80 - 100 mmHg O2 SAT 98 (H) 92 - 97 % BICARBONATE 9 (L) 22 - 26 mmol/L  
 BASE DEFICIT 27.0 mmol/L Sample source ARTERIAL    
 SITE OTHER    
 SHANNAN'S TEST N/A    
PLASMA, ALLOCATE Collection Time: 04/16/20  1:45 AM  
Result Value Ref Range Unit number [id number] Blood component type FP 24h,Thaw1 Unit division 00 Status of unit ISSUED Unit number [id number] Blood component type FP 24h,Thaw1 Unit division 00 Status of unit ISSUED Unit number [id number] Blood component type FP 24h,Thaw1 Unit division 00 Status of unit ISSUED   
BLOOD GAS, ARTERIAL Collection Time: 04/16/20  3:15 AM  
Result Value Ref Range  
 pH 6.94 CVRB GAURANG CALLES, RN @ 0320 7.35 - 7.45  
 PCO2 55 (H) 35.0 - 45.0 mmHg PO2 95 80 - 100 mmHg O2 SAT 92 92 - 97 % BICARBONATE 12 (L) 22 - 26 mmol/L  
 BASE DEFICIT 21.1 mmol/L  
 O2 METHOD VENTILATOR    
 FIO2 100 % MODE A/C Tidal volume 450 SET RATE 22    
 EPAP/CPAP/PEEP 6 Sample source ARTERIAL    
 SITE DRAWN FROM ARTERIAL LINE Critical value read back [employer] @ 0721 CRYOPRECIPITATE, ALLOCATE Collection Time: 04/16/20  4:00 AM  
Result Value Ref Range Unit number [id number] Blood component type CRYO,5U Thaw Unit division 00 Status of unit ISSUED Unit number [id number] Blood component type CRYO,5U Thaw Unit division 00 Status of unit ISSUED PLATELETS, ALLOCATE  Collection Time: 04/16/20  4:00 AM  
 Result Value Ref Range Unit number [id number] Blood component type PLTPH LR,1 Unit division 00 Status of unit ALLOCATED Unit number [id number] Blood component type PLTPH LR,2 Unit division 00 Status of unit ISSUED Unit number [id number] Blood component type PLTPH LR,1 Unit division 00 Status of unit ALLOCATED   
BLOOD GAS, ARTERIAL Collection Time: 04/16/20  5:03 AM  
Result Value Ref Range  
 pH 6.99 CVRB Nicolas Mcclain, MEE @ 0508 7.35 - 7.45  
 PCO2 59 (H) 35.0 - 45.0 mmHg PO2 186 (H) 80 - 100 mmHg O2 SAT 99 (H) 92 - 97 % BICARBONATE 14 (L) 22 - 26 mmol/L  
 BASE DEFICIT 18.0 mmol/L  
 O2 METHOD VENTILATOR    
 FIO2 100 % MODE A/C Tidal volume 450 SET RATE 22    
 EPAP/CPAP/PEEP 6 Sample source ARTERIAL    
 SITE DRAWN FROM ARTERIAL LINE Critical value read back Nicolas Mcclain RN @ 1162 PROTHROMBIN TIME + INR Collection Time: 04/16/20  5:20 AM  
Result Value Ref Range INR 1.9 (H) 0.9 - 1.1 Prothrombin time 19.0 (H) 9.0 - 11.1 sec CORTISOL Collection Time: 04/16/20  5:20 AM  
Result Value Ref Range Cortisol, random 32.9 ug/dL LACTIC ACID Collection Time: 04/16/20  5:20 AM  
Result Value Ref Range Lactic acid 17.8 (HH) 0.4 - 2.0 MMOL/L  
METABOLIC PANEL, COMPREHENSIVE Collection Time: 04/16/20  5:20 AM  
Result Value Ref Range Sodium 148 (H) 136 - 145 mmol/L Potassium 4.0 3.5 - 5.1 mmol/L Chloride 111 (H) 97 - 108 mmol/L  
 CO2 15 (LL) 21 - 32 mmol/L Anion gap 22 (H) 5 - 15 mmol/L Glucose 130 (H) 65 - 100 mg/dL BUN 6 6 - 20 MG/DL Creatinine 1.65 (H) 0.70 - 1.30 MG/DL  
 BUN/Creatinine ratio 4 (L) 12 - 20 GFR est AA 52 (L) >60 ml/min/1.73m2 GFR est non-AA 43 (L) >60 ml/min/1.73m2 Calcium 8.1 (L) 8.5 - 10.1 MG/DL Bilirubin, total 2.6 (H) 0.2 - 1.0 MG/DL  
 ALT (SGPT) 76 12 - 78 U/L  
 AST (SGOT) 419 (H) 15 - 37 U/L Alk.  phosphatase 50 45 - 117 U/L  
 Protein, total 3.4 (L) 6.4 - 8.2 g/dL Albumin 1.6 (L) 3.5 - 5.0 g/dL Globulin 1.8 (L) 2.0 - 4.0 g/dL A-G Ratio 0.9 (L) 1.1 - 2.2    
CBC WITH AUTOMATED DIFF Collection Time: 04/16/20  5:20 AM  
Result Value Ref Range WBC 8.8 4.1 - 11.1 K/uL  
 RBC 1.82 (L) 4.10 - 5.70 M/uL HGB 5.6 (LL) 12.1 - 17.0 g/dL HCT 18.4 (L) 36.6 - 50.3 % .1 (H) 80.0 - 99.0 FL  
 MCH 30.8 26.0 - 34.0 PG  
 MCHC 30.4 30.0 - 36.5 g/dL RDW 21.7 (H) 11.5 - 14.5 % PLATELET 212 (L) 838 - 400 K/uL MPV 9.7 8.9 - 12.9 FL  
 NRBC 2.5 (H) 0  WBC ABSOLUTE NRBC 0.22 (H) 0.00 - 0.01 K/uL NEUTROPHILS 44 32 - 75 % BAND NEUTROPHILS 15 (H) 0 - 6 % LYMPHOCYTES 28 12 - 49 % MONOCYTES 4 (L) 5 - 13 % EOSINOPHILS 1 0 - 7 % BASOPHILS 0 0 - 1 % METAMYELOCYTES 5 (H) 0 % MYELOCYTES 3 (H) 0 % IMMATURE GRANULOCYTES 0 %  
 ABS. NEUTROPHILS 5.2 1.8 - 8.0 K/UL  
 ABS. LYMPHOCYTES 2.5 0.8 - 3.5 K/UL  
 ABS. MONOCYTES 0.4 0.0 - 1.0 K/UL  
 ABS. EOSINOPHILS 0.1 0.0 - 0.4 K/UL  
 ABS. BASOPHILS 0.0 0.0 - 0.1 K/UL  
 ABS. IMM. GRANS. 0.0 K/UL  
 DF MANUAL    
 RBC COMMENTS ANISOCYTOSIS 1+ 
    
 RBC COMMENTS MICROCYTOSIS 1+ 
    
 RBC COMMENTS DAVEY CELLS 
PRESENT 
    
 RBC COMMENTS POLYCHROMASIA PRESENT 
    
CBC WITH AUTOMATED DIFF Collection Time: 04/16/20  8:15 AM  
Result Value Ref Range WBC 9.3 4.1 - 11.1 K/uL  
 RBC 2.41 (L) 4.10 - 5.70 M/uL HGB 7.6 (L) 12.1 - 17.0 g/dL HCT 24.5 (L) 36.6 - 50.3 % .7 (H) 80.0 - 99.0 FL  
 MCH 31.5 26.0 - 34.0 PG  
 MCHC 31.0 30.0 - 36.5 g/dL  
 RDW 18.1 (H) 11.5 - 14.5 % PLATELET 767 (L) 226 - 400 K/uL MPV 9.6 8.9 - 12.9 FL  
 NRBC 2.4 (H) 0  WBC ABSOLUTE NRBC 0.22 (H) 0.00 - 0.01 K/uL NEUTROPHILS 41 32 - 75 % BAND NEUTROPHILS 25 (H) 0 - 6 % LYMPHOCYTES 22 12 - 49 % MONOCYTES 9 5 - 13 % EOSINOPHILS 0 0 - 7 % BASOPHILS 1 0 - 1 % MYELOCYTES 2 (H) 0 % IMMATURE GRANULOCYTES 0 % ABS. NEUTROPHILS 6.1 1.8 - 8.0 K/UL  
 ABS. LYMPHOCYTES 2.0 0.8 - 3.5 K/UL  
 ABS. MONOCYTES 0.8 0.0 - 1.0 K/UL  
 ABS. EOSINOPHILS 0.0 0.0 - 0.4 K/UL  
 ABS. BASOPHILS 0.1 0.0 - 0.1 K/UL  
 ABS. IMM. GRANS. 0.0 K/UL  
 DF MANUAL    
 RBC COMMENTS ANISOCYTOSIS 1+ 
    
 RBC COMMENTS POLYCHROMASIA 1+ WBC COMMENTS TOXIC GRANULATION Discussed with:   
Family, Nurse and Attending/Consulting Thank you so much to allow us to participate in this patient's care. We will follow. 
: Estela Dixon MD 
4/16/2020 [facility]: 
[email or web address][note 3][email or web address] [email or web address] [email or web address] [place] office: 
[address], [address] Phone: [phone] Fax :     [phone] [place] office: 
[address] [facility] [address] [facility] Phone - [phone] Fax - [phone]

## 2020-04-16 NOTE — PROGRESS NOTES
Reason for Admission:   Bleeding ruptured right renal artery with hemodynamic instability,s/p coiling embolization,abdominal compartment syndrome. lactic acidosis RUR Score:  15% Plan for utilizing home health: To be determined PCP: First and Last name:  Dr Mary Jo Delaney Name of Practice: Internal Medicine off of [address] [facility] [place] Are you a current patient: Yes/No: yes Approximate date of last visit: not known Current Advanced Directive/Advance Care Plan: no code status currently on on file There is no AMD.Spouse is [employer]. Chepe Bishop number is [phone]. Transition of Care Plan: Pt was admitted to ICU on 4/16/20 with a ruptured right renal artery  Aneurysm with hemodynamic instability. Prior to hospitalization,pt was dizzy and experiencing syncopal episodes. Pt was also experiencing right lower abdominal pain ,diaphoresis and jaundice. Pt received coiling of his right renal artery and today,pt had an exploratory lap due to abdominal compartment syndrome. Pt has underlying liver disease with transaminitis,bilirubinemia and jaundice. Per report drinks alcohol of an unknown quantity daily. Pt remains intubated  due to respiratory failure. Pt had PEA arrest on 4/15/20  Per nephrology, pt may need CCRT Pt has received multiple units of PRBCs ,FFP,platelets and cryo (DIC) Due to multiple phone calls from physicians and due to the difficult and stressful night/day wife has had,I will follow up with wife tomorrow. Intensivist contacted pt.'s wife to discuss goals of care and code status as pt continues to be hemodynamically unstable. Per intensivist,wife is contacting their daughters and will get back with pt.'s nurse or physician regarding code status.  
 
Joanna Beaulieu

## 2020-04-16 NOTE — PROGRESS NOTES
Bedside and Verbal shift change report given to Missy (oncoming nurse) by Marianna Joshua (offgoing nurse). Report included the following information SBAR, Kardex, ED Summary, OR Summary, Procedure Summary, Intake/Output, MAR, Recent Results and Alarm Parameters . [place] Pt jaundiced. Vent- peep 6, fiO2 100, RR 30, vol 480 Wali- 300 Levo- 150 Epi- 50mcg Bicarb- 125 LR- 100ml/hr 
 
0800 Report given to OR team, pt going back to OR. 
 
0930 Pt received from the OR. Report included procedure summary. Open abdomen with wound vac. Heating blanket applied for temp ~92. Shift Summary Pt had MAPs in the 50s upon returing from OR, 4 units of PRBCs, 2 units of FFP, 2 units of platelets ordered by Dr. Moi Toledo and despite all efforts hgb drops to 6. MAPs continued to drop, LR bolus given. ABG came back with bicarb of 6 and 2 amps of bicarb were given, BP responded. ABG again at 1700 came back acidotic and with low bicarb, 2 more amps of bicarb given. Evening labs came back with hbg of 6, Dr. Lizbeth Ch was called and made aware. 4 units of PBRC and 1 platelet were ordered along with a CBC and coag to be pulled 1 hour post transfusion. Dr. Kathie Retana (with Dr. Suzie Pantoja office) was called to update about 2500ml output in the wound vac throughout shift. Dr. Kathie Retana reported that he would take a look at the pts chart and give a call back. Throughout the shift pressors were all maxed at levo of 200, wali of 300, and vaso of 0.04, and epi at 75. Pt on bicarb drip as well. Talked with [facility] to make aware of pt condition. Will call them back if pt passes.

## 2020-04-16 NOTE — ED NOTES
Pt enroute to [facility] ED/OR. CCT transport administered peripheral Wali/vasopressors prior to transport. EMTALA and encounter summary provided to CCT with bedside report. Pt has no signs or symptoms of adverse effects from blood transfusions at this time. Pt enroute to [facility] with 2nd and 3rd liters of uncrossmatched blood, Vitamin K, and push dose vasopressors.

## 2020-04-16 NOTE — ED NOTES
MAP decreased to mid 40s during CT scans. Pt increasingly diaphoretic with increase in abdominal pain. Provider in CT scan and aware. Additional IVF held due to possible hemodilution.

## 2020-04-16 NOTE — PROGRESS NOTES
[address] ICU - FM Resident Daily Progress Note Name: Arielle Flynn : 1960 MRN: [id number] Date: 2020 10:52 AM  
 
I have reviewed the flowsheet and previous days notes. The patient is unable to give any meaningful history or review of systems because the patient is critically ill and intubated. Overnight events reviewed: · OR for renal artery embolization · Return to OR- Ex-lap for abdominal compartment syndrome, wound vac in place · 4 pressor support Arielle Flynn is a 60 y.o. male with a PMHx of COPD, CHF, CAD, DM, GERD who presented acutely for severe abdominal pain. 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 Unmatched blood was urgently transfused, yet he arrested (PEA per ICU RN) with ROSC in  <8mins. In ED also had episode of coffee ground emesis. The pt was then brought to [facility] OR where R renal artery embolization was performed. Concern for abdominal compartment syndrome was raised due to clinically presentation and bladder pressures. For this he was taken back to the OR for ex-lap and now has a wound vac in place actively draining blood. At this time the pt is s/p 9 PRBC, 3 plasma, 2 Plts, 5 Cryo Vital Signs:   
Visit Vitals BP (!) 80/55 Pulse (!) 127 Temp (!) 91.3 °F (32.9 °C) Resp 30 Ht 6' 4\" (1.93 m) Wt 220 lb (99.8 kg) SpO2 91% BMI 26.78 kg/m² O2 Device: Endotracheal tube O2 Flow Rate (L/min): 2 l/min Temp (24hrs), Av.5 °F (33.6 °C), Min:90.1 °F (32.3 °C), Max:97.6 °F (36.4 °C) Intake/Output:  
Last shift:       07 - 1900 In: 829.8 [I.V.:209.8] Out: 1500 [Drains:900] Last 3 shifts: 1901 -  0700 In: 4491.3 [I.V.:2245.8] Out: 90 [Urine:90] Intake/Output Summary (Last 24 hours) at 2020 1052 Last data filed at 2020 7030 Gross per 24 hour Intake 5321.07 ml Output 1590 ml Net 3731.07 ml Ventilator Settings: 
Ventilator Mode: Assist control, PRVC Respiratory Rate Back-Up Rate: 30 Insp Flow (l/min): 60 l/min Ventilator Volumes Vt Set (ml): 480 ml Vt Exhaled (Machine Breath) (ml): 497 ml Ve Observed (l/min): 15 l/min Ventilator Pressures PIP Observed (cm H2O): 23 cm H2O 
MAP (cm H2O): 15 PEEP/VENT (cm H2O): 6 cm H20 Auto PEEP Observed (cm H2O): 0 cm H2O Physical Exam: 
General:  Intubated lying in bed. Head:  Normocephalic, without obvious abnormality, atraumatic. Eyes:  Conjunctivae/corneas clear. PERRL. Nose: Packing in place, blood on gauze. Throat: Lips, mucosa, and tongue normal. Teeth and gums normal. ETT in place. Neck: Supple, symmetrical, trachea midline, no adenopathy, no carotid bruit, and no JVD Lungs:   Clear to auscultation bilaterally. Heart:  Regular rate and rhythm, S1, S2 normal, no murmur, click, rub or gallop. Abdomen:   Wound vac in place, blood draining continually Extremities: Extremities normal, atraumatic, no cyanosis or edema. Pulses: Weak pulses in extremities. Skin: Skin pallor. Neurologic: Sedated DATA:  
Current Facility-Administered Medications Medication Dose Route Frequency  sodium chloride (NS) flush 5-40 mL  5-40 mL IntraVENous Q8H  
 sodium chloride (NS) flush 5-40 mL  5-40 mL IntraVENous PRN  
 PHENYLephrine (CHAS-SYNEPHRINE) 100 mg in 0.9% sodium chloride 250 mL infusion   mcg/min IntraVENous TITRATE  vasopressin (VASOSTRICT) 20 Units in 0.9% sodium chloride 100 mL infusion  0-0.04 Units/min IntraVENous TITRATE  sodium bicarbonate (8.4%) 150 mEq in dextrose 5% 1,000 mL infusion   IntraVENous CONTINUOUS  
 0.9% sodium chloride infusion 250 mL  250 mL IntraVENous PRN  
 albumin human 25% (BUMINATE) solution 12.5 g  12.5 g IntraVENous Q6H  
 piperacillin-tazobactam (ZOSYN) 3.375 g in 0.9% sodium chloride (MBP/ADV) 100 mL  3.375 g IntraVENous Q8H  
 pantoprazole (PROTONIX) 40 mg in 0.9% sodium chloride 10 mL injection  40 mg IntraVENous Q12H  
 0.9% sodium chloride infusion 250 mL  250 mL IntraVENous PRN  
 0.9% sodium chloride infusion 250 mL  250 mL IntraVENous PRN  
 hydrocortisone Sod Succ (PF) (SOLU-CORTEF) injection 100 mg  100 mg IntraVENous Q8H  
 EPINEPHrine (ADRENALIN) 10 mg in 0.9% sodium chloride 250 mL infusion  0-100 mcg/min IntraVENous TITRATE  
 NOREPINephrine (LEVOPHED) 32,000 mcg in dextrose 5% 250 mL infusion  0.5-30 mcg/min IntraVENous TITRATE  
 0.9% sodium chloride infusion 250 mL  250 mL IntraVENous PRN  
 0.9% sodium chloride infusion 250 mL  250 mL IntraVENous PRN Telemetry: Sinus tachycardia, regular rhythm Labs: 
Recent Labs 04/16/20 
2136 04/16/20 
1051 04/16/20 
0115 WBC 9.3 8.8 11.1 HGB 7.6* 5.6* 7.1*  
HCT 24.5* 18.4* 23.5*  
* 103* 23* Recent Labs 04/16/20 
1256 04/16/20 
0115 04/15/20 
2113 * 145 137  
K 4.0 4.6 3.6 * 115* 100 CO2 15* 12* 26 * 171* 180* BUN 6 5* 4*  
CREA 1.65* 1.35* 1.10 CA 8.1* 6.2* 8.1* ALB 1.6*  --  2.3*  
SGOT 419*  --  153* ALT 76  --  48 INR 1.9* >13.7* 2.5* Recent Labs 04/16/20 
0503 04/16/20 
0315 04/16/20 
2661 PH 6.99 CVRB Giacomo Gardiner, RN @ 7124 6.94 Kvng Mackenzie, RN @ 2681 3.23* PCO2 59* 55* 58* PO2 186* 95 204* HCO3 14* 12* 9*  
FIO2 100 100  --   
 
 
Imaging:  I have personally reviewed the patients radiographs and reports. Micro: 
Results ** No results found for the last 336 hours. ** IMPRESSION:  
· R renal artery aneurysm rupture, s/p R renal artery embolization · Anemia 2/2 to acute loss · Thrombocytopenia/coagulopathy- DIC · Metabolic acidosis- elevated lactate · Acute respiratory failure · PEA arrest in ED <8 mins (4/15) · UGIB · DUNCAN- Baseline Cr ~0.8. PLAN:  
· Monitor wound vac output · Watch H&H closely and transfuse as needed- S/p 9 PRBC, 3 plasma, 2 Plts, 5 Cryo · Continue pressor support- Epi, Norepi, Phenylephrine, Vasopressin · Solu-cortef 100mg Q8h · Empiric Zosyn · If maintaining pressors will pursue IJ line and dialysis. GLOBAL ISSUES:  
· Head of bed elevated · GI Prophylaxis: PPI · DVT Prophylaxis: Actively bleeding-DIC 
· ETT placed on 4/15 My assessment/plan will be discussed with Dr. Yrn Keith, ICU Attending Physician.  
 
Dennis Muller MD 
Family Medicine Resident, PGY-1

## 2020-04-16 NOTE — CONSULTS
[facility] Pulmonary, Critical Care, and Sleep Medicine Initial Patient Consult-Telemedicine consult performed. Name: Shama Orlando MRN: [id number] : 1960 Hospital: [address] [facility] Date: 2020 IMPRESSION:  
· S/p emergent right renal artery coil embolization 2020 for ruptured right renal artery with large right RP hematoma. · Acute resp failure-  due to profound acidosis and shock. .. [place] oxygenation acceptable, CTA chest clear. No known chronic lung disease · Acute blood loss anemia-s/p massive transfusion ( 6 units PRBC) with dilutional coagulapathy and DIC ( fibrinogen < 100) · Coagulopathy/thrombocytopenia- DIC · Shock- hemorrhagic and I cannot r/o abd compartment syndrome. LV fxn not known. ? Cortisol. Suspect ECFV low as SPV on A line tracing > 10%. · S/p brief PEA arrest in ED 4/15/2020 · Profound metabolic acidosis- suspect lactate, likely with secondary bowel ischemia. · DUNCAN · UGIB- coffee ground emesis in ED ? Ulcer ? Vascular-enteric fistula · H/o RA-home meds unclear RECOMMENDATIONS:  
· Pt is gravely ill. · Vent- lung protective ventilation · Shock- augment preload- D5W 3 amps bicarb + colloid loading. Check cortisol, get ECHO. Check bladder pressure- if > 25 will ask surgery to emergently re-evaluate tonight · DIC/coagulopathy- homeostatic resusc.1:1:1 PRBC/PLT/FFP. Also give cryo. · Follow up coags/ABG · Start zosyn for probable bowel ischemia/translocation · Renal consult for likely CRRT 
· Pt is critically ill CCT EOP 30 min. At risk for decline due  To shock MODs. D/w bedside ICU RN 
· Telemedicine visit performed. Subjective: This patient has been seen and evaluated at the request of Dr. Dieter Arevalo for shock/resp failure.  Patient is a 60 y.o. male  H/o RA/ AS who was having right sided abd pain today and presented to the ED with these complaints and a Hb < 8. CT abd showed a large right RP hematoma with ruptured right renal artery. Pt then proceeded to have coffee ground emesis in ED and arrested ( PEA per ICU RN) and ROSC < 8 min. Pt taken emergently to OR and had percutaneous coil embolization right renal artery. Comes to ICU and examined with ICU IPAD via telemedicine. Pt on three pressors. On VACV  PEEP 6 FIO2 100% PIP 24. Past Medical History:  
Diagnosis Date  Rheumatoid arthritis(714.0) as child/20's  Trauma History reviewed. No pertinent surgical history. Prior to Admission medications Not on File No Known Allergies Social History Tobacco Use  Smoking status: Never Smoker  Smokeless tobacco: Never Used Substance Use Topics  Alcohol use: Yes Alcohol/week: 0.0 standard drinks Types: 2 - 3 Cans of beer per week Comment: occasionally Family History Problem Relation Age of Onset  Depression Mother  Stroke Father 80  
 Cancer Father 80  Cancer Brother 46  
     prostate Current Facility-Administered Medications Medication Dose Route Frequency  sodium chloride (NS) flush 5-40 mL  5-40 mL IntraVENous Q8H  
 PHENYLephrine (CHAS-SYNEPHRINE) 100 mg in 0.9% sodium chloride 250 mL infusion   mcg/min IntraVENous TITRATE  
 NOREPINephrine (LEVOPHED) 8,000 mcg in dextrose 5% 250 mL infusion  0.5-16 mcg/min IntraVENous TITRATE  EPINEPHrine (ADRENALIN) 5 mg in 0.9% sodium chloride 250 mL infusion  0-10 mcg/min IntraVENous TITRATE  vasopressin (VASOSTRICT) 20 Units in 0.9% sodium chloride 100 mL infusion  0-0.04 Units/min IntraVENous TITRATE  sodium bicarbonate (8.4%) injection 100 mEq  100 mEq IntraVENous ONCE  
 sodium bicarbonate (8.4%) 150 mEq in dextrose 5% 1,000 mL infusion   IntraVENous CONTINUOUS  
 albumin human 25% (BUMINATE) solution 12.5 g  12.5 g IntraVENous Q6H  
 piperacillin-tazobactam (ZOSYN) 3.375 g in 0.9% sodium chloride (MBP/ADV) 100 mL  3.375 g IntraVENous Q8H  
 pantoprazole (PROTONIX) 40 mg in 0.9% sodium chloride 10 mL injection  40 mg IntraVENous Q12H Review of Systems: 
Review of systems not obtained due to patient factors. Objective:  
Vital Signs:   
Visit Vitals BP (!) 69/43 Pulse (!) 116 Temp 95 °F (35 °C) Resp 24 Ht 6' 4\" (1.93 m) Wt 99.8 kg (220 lb) SpO2 98% BMI 26.78 kg/m² O2 Device: Ventilator O2 Flow Rate (L/min): 2 l/min Temp (24hrs), Av.7 °F (35.9 °C), Min:95 °F (35 °C), Max:97.6 °F (36.4 °C) Intake/Output:  
Last shift:      04/15 1901 -  0700 In:  [I.V.:1050] Out: - Last 3 shifts: No intake/output data recorded. Intake/Output Summary (Last 24 hours) at 2020 7498 Last data filed at 2020 8914 Gross per 24 hour Intake 1980 ml Output  Net 1980 ml Physical Exam: NCAT/ashen color vent tracings no auto PEEP, abd exposed by RN on telemedicine exam and is distended and tight, mottled. SPV > 10% seen on A line tracing. Data review:  
 
Recent Results (from the past 24 hour(s)) CBC WITH AUTOMATED DIFF Collection Time: 04/15/20  9:13 PM  
Result Value Ref Range WBC 9.2 4.1 - 11.1 K/uL  
 RBC 2.98 (L) 4.10 - 5.70 M/uL  
 HGB 11.4 (L) 12.1 - 17.0 g/dL HCT 32.9 (L) 36.6 - 50.3 % .4 (H) 80.0 - 99.0 FL  
 MCH 38.3 (H) 26.0 - 34.0 PG  
 MCHC 34.7 30.0 - 36.5 g/dL  
 RDW 18.4 (H) 11.5 - 14.5 % PLATELET 79 (L) 477 - 400 K/uL MPV 13.0 (H) 8.9 - 12.9 FL  
 NRBC 0.4 (H) 0.0  WBC ABSOLUTE NRBC 0.04 (H) 0.00 - 0.01 K/uL NEUTROPHILS 62 32 - 75 % BAND NEUTROPHILS 3 0 - 6 % LYMPHOCYTES 25 12 - 49 % MONOCYTES 7 5 - 13 % EOSINOPHILS 2 0 - 7 % BASOPHILS 0 0 - 1 % METAMYELOCYTES 1 (H) 0 % IMMATURE GRANULOCYTES 0 %  
 ABS. NEUTROPHILS 6.0 1.8 - 8.0 K/UL  
 ABS. LYMPHOCYTES 2.3 0.8 - 3.5 K/UL  
 ABS. MONOCYTES 0.6 0.0 - 1.0 K/UL ABS. EOSINOPHILS 0.2 0.0 - 0.4 K/UL  
 ABS. BASOPHILS 0.0 0.0 - 0.1 K/UL  
 ABS. IMM. GRANS. 0.0 K/UL  
 DF MANUAL    
 RBC COMMENTS ANISOCYTOSIS 1+ 
    
 RBC COMMENTS MACROCYTOSIS 
PRESENT 
    
 RBC COMMENTS DAVEY CELLS 
PRESENT 
    
 RBC COMMENTS TARGET CELLS 2+ WBC COMMENTS TOXIC GRANULATION    
METABOLIC PANEL, BASIC Collection Time: 04/15/20  9:13 PM  
Result Value Ref Range Sodium 137 136 - 145 mmol/L Potassium 3.6 3.5 - 5.1 mmol/L Chloride 100 97 - 108 mmol/L  
 CO2 26 21 - 32 mmol/L Anion gap 11 5 - 15 mmol/L Glucose 180 (H) 65 - 100 mg/dL BUN 4 (L) 6 - 20 MG/DL Creatinine 1.10 0.70 - 1.30 MG/DL  
 BUN/Creatinine ratio 4 (L) 12 - 20 GFR est AA >60 >60 ml/min/1.73m2 GFR est non-AA >60 >60 ml/min/1.73m2 Calcium 8.1 (L) 8.5 - 10.1 MG/DL  
TROPONIN I Collection Time: 04/15/20  9:13 PM  
Result Value Ref Range Troponin-I, Qt. <0.05 <0.05 ng/mL LIPASE Collection Time: 04/15/20  9:13 PM  
Result Value Ref Range Lipase 310 73 - 393 U/L  
LACTIC ACID Collection Time: 04/15/20  9:13 PM  
Result Value Ref Range Lactic acid 5.2 (HH) 0.4 - 2.0 MMOL/L  
HEPATIC FUNCTION PANEL Collection Time: 04/15/20  9:13 PM  
Result Value Ref Range Protein, total 6.3 (L) 6.4 - 8.2 g/dL Albumin 2.3 (L) 3.5 - 5.0 g/dL Globulin 4.0 2.0 - 4.0 g/dL A-G Ratio 0.6 (L) 1.1 - 2.2 Bilirubin, total 6.5 (H) 0.2 - 1.0 MG/DL Bilirubin, direct 3.5 (H) 0.0 - 0.2 MG/DL Alk. phosphatase 124 (H) 45 - 117 U/L  
 AST (SGOT) 153 (H) 15 - 37 U/L  
 ALT (SGPT) 48 12 - 78 U/L  
PROTHROMBIN TIME + INR Collection Time: 04/15/20  9:13 PM  
Result Value Ref Range INR 2.5 (H) 0.9 - 1.1 Prothrombin time 23.1 (H) 9.0 - 11.1 sec SAMPLES BEING HELD Collection Time: 04/15/20  9:18 PM  
Result Value Ref Range SAMPLES BEING HELD 1 RED 1 SST   
 COMMENT   Add-on orders for these samples will be processed based on acceptable specimen integrity and analyte stability, which may vary by analyte. EMERGENT RELEASE OF UNCROSSMATCHED RED CELLS Collection Time: 04/15/20 10:22 PM  
Result Value Ref Range Crossmatch Expiration 04/18/2020 ABO/Rh(D) A POSITIVE Antibody screen NEG Unit number [id number] Blood component type RC LR Unit division 00 Status of unit ISSUED Crossmatch result Compatible Unit number [id number] Blood component type RC LR,2 Unit division 00 Status of unit ISSUED Crossmatch result Compatible Unit number [id number] Blood component type RC LR Unit division 00 Status of unit ISSUED Crossmatch result Compatible Unit number [id number] Blood component type RC LR,2 Unit division 00 Status of unit REL FROM [facility] Crossmatch result Emergency Release Unit number [id number] Blood component type RC LR,1 Unit division 00 Status of unit REL FROM [facility] Crossmatch result Emergency Release Unit number [id number] Blood component type RC LR,1 Unit division 00 Status of unit REL FROM [facility] Crossmatch result Emergency Release Unit number [id number] Blood component type RC LR Unit division 00 Status of unit ISSUED Crossmatch result Compatible Unit number [id number] Blood component type RC LR Unit division 00 Status of unit ISSUED Crossmatch result Compatible Unit number [id number] Blood component type RC LR Unit division 00 Status of unit ISSUED Crossmatch result Compatible Unit number [id number] Blood component type RC LR,1 Unit division 00 Status of unit ISSUED Crossmatch result Compatible SAMPLES BEING HELD Collection Time: 04/15/20 10:22 PM  
Result Value Ref Range SAMPLES BEING HELD 1 PINK COMMENT   Add-on orders for these samples will be processed based on acceptable specimen integrity and analyte stability, which may vary by analyte. PLATELETS, ALLOCATE Collection Time: 04/16/20 12:45 AM  
Result Value Ref Range Unit number [id number] Blood component type PLTPH LR,2 Unit division 00 Status of unit ISSUED Unit number [id number] Blood component type PLTPH LR,2 Unit division 00 Status of unit ISSUED   
CBC W/O DIFF Collection Time: 04/16/20  1:15 AM  
Result Value Ref Range WBC 11.1 4.1 - 11.1 K/uL  
 RBC 2.18 (L) 4.10 - 5.70 M/uL HGB 7.1 (L) 12.1 - 17.0 g/dL HCT 23.5 (L) 36.6 - 50.3 % .8 (H) 80.0 - 99.0 FL  
 MCH 32.6 26.0 - 34.0 PG  
 MCHC 30.2 30.0 - 36.5 g/dL RDW 22.4 (H) 11.5 - 14.5 % PLATELET 23 (LL) 106 - 400 K/uL NRBC 2.4 (H) 0  WBC ABSOLUTE NRBC 0.26 (H) 0.00 - 0.01 K/uL COAGULATION SCREEN Collection Time: 04/16/20  1:15 AM  
Result Value Ref Range Fibrinogen <60 (LL) 200 - 475 mg/dL INR >13.7 (HH) 0.9 - 1.1 Prothrombin time >120.0 (H) 9.0 - 11.1 sec  
 aPTT >130.0 (HH) 22.1 - 32.0 sec  
 aPTT, therapeutic range     58.0 - 76.2 SECS  
METABOLIC PANEL, BASIC Collection Time: 04/16/20  1:15 AM  
Result Value Ref Range Sodium 145 136 - 145 mmol/L Potassium 4.6 3.5 - 5.1 mmol/L Chloride 115 (H) 97 - 108 mmol/L  
 CO2 12 (LL) 21 - 32 mmol/L Anion gap 18 (H) 5 - 15 mmol/L Glucose 171 (H) 65 - 100 mg/dL BUN 5 (L) 6 - 20 MG/DL Creatinine 1.35 (H) 0.70 - 1.30 MG/DL  
 BUN/Creatinine ratio 4 (L) 12 - 20 GFR est AA >60 >60 ml/min/1.73m2 GFR est non-AA 54 (L) >60 ml/min/1.73m2 Calcium 6.2 (LL) 8.5 - 10.1 MG/DL  
SAMPLES BEING HELD Collection Time: 04/16/20  1:15 AM  
Result Value Ref Range SAMPLES BEING HELD 1RED COMMENT Add-on orders for these samples will be processed based on acceptable specimen integrity and analyte stability, which may vary by analyte. PLASMA, ALLOCATE  Collection Time: 04/16/20  1:45 AM  
 Result Value Ref Range Unit number [id number] Blood component type FP 24h,Thaw1 Unit division 00 Status of unit ALLOCATED   
BLOOD GAS, ARTERIAL Collection Time: 04/16/20  3:15 AM  
Result Value Ref Range  
 pH 6.94 CVRB GAURANG CALLES RN @ 0320 7.35 - 7.45  
 PCO2 55 (H) 35.0 - 45.0 mmHg PO2 95 80 - 100 mmHg O2 SAT 92 92 - 97 % BICARBONATE 12 (L) 22 - 26 mmol/L  
 BASE DEFICIT 21.1 mmol/L  
 O2 METHOD VENTILATOR    
 FIO2 100 % MODE A/C Tidal volume 450 SET RATE 22    
 EPAP/CPAP/PEEP 6 Sample source ARTERIAL    
 SITE DRAWN FROM ARTERIAL LINE Critical value read back [employer] @ 0528 Imaging: 
I have personally reviewed the patients radiographs and have reviewed the reports: CTA chest no PE no ILD no emphysema no masses no effusions.    
 
  
Tommy Albarado MD

## 2020-04-16 NOTE — ANESTHESIA POSTPROCEDURE EVALUATION
Procedure(s): COIL EMBOLIZATION OF RIGHT RENAL ARTERY ANEURYSM. No value filed. Anesthesia Post Evaluation Multimodal analgesia: multimodal analgesia not used between 6 hours prior to anesthesia start to PACU discharge Patient location during evaluation: ICU Patient participation: waiting for patient participation Level of consciousness: obtunded/minimal responses Pain score: 0 Airway patency: patent Anesthetic complications: no 
Cardiovascular status: unstable Respiratory status: ETT Hydration status: hypovolemic Comments: Pt brought to ICU from OR - labs returned shortly after arrival to ICU - Hgb 7.1, platelets 23, INR 18.6. BP in the 44G systolic on epinephrine and petros. Report given to ICU staff - pt received 6 U RBC and receiving first bag of platelets. Plasma ordered but not yet ready. Care handed off to ICU staff. Post anesthesia nausea and vomiting:  none Vitals Value Taken Time BP 66/39 4/16/2020  2:50 AM  
Temp 35 °C (95 °F) 4/16/2020  2:49 AM  
Pulse 104 4/16/2020  2:52 AM  
Resp 24 4/16/2020  2:52 AM  
SpO2 99 % 4/16/2020  2:49 AM  
Vitals shown include unvalidated device data.

## 2020-04-16 NOTE — PROGRESS NOTES
Pt cont to do poorly. Low MAP despite maximal pressors. Has received volume and bicarb. Very acidotic. Will give more FFP, plts, and PRBC. I have called Mrs Eduar Nye and discussed situation with her and his poor prognosis. She does not think he would want to be resuscitated with cardiac arrest.  Before we change his code status, she wants to discuss with their adult daughters. She will call them and let us know their decision.

## 2020-04-16 NOTE — PROGRESS NOTES
Spiritual Care Assessment/Progress Note Gurinder Factor 
 
 
NAME: Thomas Pepper      MRN: [id number] AGE: 60 y.o. SEX: male Yazidism Affiliation:   
Language: [place] 4/16/2020     Total Time (in minutes): 21 Spiritual Assessment begun in [facility] SURGERY through conversation with: 
  
    []Patient        [x] Family    [] Friend(s) Reason for Consult: Family care Spiritual beliefs: (Please include comment if needed) [x] Identifies with a joao tradition:     
   [] Supported by a joao community:        
   [] Claims no spiritual orientation:       
   [] Seeking spiritual identity:            
   [] Adheres to an individual form of spirituality:       
   [] Not able to assess:                   
 
    
Identified resources for coping:  
   [x] Prayer                           
   [] Music                  [] Guided Imagery [x] Family/friends                 [] Pet visits [] Devotional reading                         [] Unknown 
   [] Other:                                          
 
 
Interventions offered during this visit: (See comments for more details) Family/Friend(s): Affirmation of emotions/emotional suffering, Affirmation of joao, Normalization of emotional/spiritual concerns, Initial Assessment Plan of Care: 
 
 [] Support spiritual and/or cultural needs  
 [] Support AMD and/or advance care planning process    
 [] Support grieving process 
 [] Coordinate Rites and/or Rituals  
 [] Coordination with community clergy [] No spiritual needs identified at this time 
 [] Detailed Plan of Care below (See Comments)  [] Make referral to Music Therapy 
[] Make referral to Pet Therapy    
[] Make referral to Addiction services 
[] Make referral to [facility] 
[] Make referral to Spiritual Care Partner 
[] No future visits requested       
[x] Follow up visits as needed Comments:  responded to page to provide support to pt, . Yousifgel's family. Pt's wife, Bernadette Silva,  two daughters and a son in law were present at the [address] waiting area, very distraut. Bernadette Silva shared pt's hospitalization story and processed her emotions. She indicated that everything happened so fast and unexpectedly. She stated that they can only look up to God for help at this moment.  offered listening presence and empathy, affirmation of feelings. Family welcomed 's offer to say a prayer for Argelia Dear, they were advised spiritual care presence as needed or upon request. Family thanked  for the prayer and visit. Spiritual care will continue to follow up as needed. Visited by: Jerson Mcmillan. To janna balderas: [phone])

## 2020-04-16 NOTE — PROGRESS NOTES
responded to request by pt's wife through staff, for prayer with pt, Reese Strickland.  consulted with staff and talked with wife, who indicated that pt is Holiness and that she would like prayer offered for him, preferably by a . [address] consulted Elizabeth Noble, she will pass information on  to follow up. Spiritual care is still available as needed. Visited by: Odette John. To page : [phone])

## 2020-04-16 NOTE — PROGRESS NOTES
Patient with ruptured Renal artery aneurysm Informed consent not signed because of CPR and emergent nature Risks benefits and alternatives discussed in detail with wife and family.

## 2020-04-16 NOTE — ED NOTES
1st unit of blood complete; 2nd emergent uncrossmatched blood started with Alaina Finley. 2nd unit #[id number]

## 2020-04-16 NOTE — PROGRESS NOTES
called pt's wife to inform her that a , father Lenin Gardner went to Pavan george's room and performed the last rite at his bed side, as she had requested for. Spouse, Deidre Sin was very thankful for the call and the prayer. Spiritual care will continue to follow up as needed. Visited by: Janet Og. To janna balderas: [phone]

## 2020-04-16 NOTE — BRIEF OP NOTE
Brief Postoperative Note Patient: Tunde Wick YOB: 1960 MRN: [id number] Date of Procedure: 4/15/2020 Pre-Op Diagnosis: RIGHT RENAL ARTERIAL ANEURYSM Post-Op Diagnosis: Same as preoperative diagnosis. Procedure(s): 
Coil embolization right renal artery aneurysm Surgeon(s): Mikaela De La Cruz MD 
 
Surgical Assistant: None Anesthesia: General  
 
Estimated Blood Loss (mL): 1000 Complications: Bleeding Specimens: * No specimens in log * Implants: * No implants in log * Drains: * No LDAs found * Findings: 0 Electronically Signed by Sarabjit Mojica MD on 4/16/2020 at 1:55 AM

## 2020-04-16 NOTE — ED NOTES
Pt to [facility] ED/OR with CCT at this time. TRANSFER - OUT REPORT: 
 
Verbal report given to [facility]) on Megha Torres  being transferred to [facility] ED(unit) for routine progression of care Report consisted of patients Situation, Background, Assessment and  
Recommendations(SBAR). Information from the following report(s) SBAR, ED Summary, MAR, Recent Results and Cardiac Rhythm sinus tach was reviewed with the receiving nurse. Lines:  
Peripheral IV 04/15/20 Right Antecubital (Active) Site Assessment Clean, dry, & intact 4/15/2020  9:14 PM  
Phlebitis Assessment 0 4/15/2020  9:14 PM  
Infiltration Assessment 0 4/15/2020  9:14 PM  
Dressing Status Clean, dry, & intact 4/15/2020  9:14 PM  
Dressing Type Tape;Transparent 4/15/2020  9:14 PM  
Hub Color/Line Status Pink;Flushed;Patent 4/15/2020  9:14 PM  
Alcohol Cap Used Yes 4/15/2020  9:14 PM  
   
Peripheral IV 04/15/20 Left Antecubital (Active) Site Assessment Clean, dry, & intact 4/15/2020  9:23 PM  
Phlebitis Assessment 0 4/15/2020  9:23 PM  
Infiltration Assessment 0 4/15/2020  9:23 PM  
Dressing Status Clean, dry, & intact 4/15/2020  9:23 PM  
Hub Color/Line Status Pink 4/15/2020  9:23 PM  
   
Peripheral IV 04/15/20 Left Hand (Active) Site Assessment Clean, dry, & intact 4/15/2020 10:24 PM  
Phlebitis Assessment 0 4/15/2020 10:24 PM  
Infiltration Assessment 0 4/15/2020 10:24 PM  
Dressing Status Clean, dry, & intact 4/15/2020 10:24 PM  
Hub Color/Line Status Pink 4/15/2020 10:24 PM  
Alcohol Cap Used Yes 4/15/2020 10:24 PM  
   
Peripheral IV 04/15/20 Right Forearm (Active) Site Assessment Clean, dry, & intact 4/15/2020 10:27 PM  
Phlebitis Assessment 0 4/15/2020 10:27 PM  
Infiltration Assessment 0 4/15/2020 10:27 PM  
Dressing Status Clean, dry, & intact 4/15/2020 10:27 PM  
Hub Color/Line Status Pink 4/15/2020 10:27 PM  
  
 
Opportunity for questions and clarification was provided. Patient transported with: 
 Monitor Registered Nurse CCT/ALS

## 2020-04-16 NOTE — PROGRESS NOTES
Patient continues to be critically ill Bladder pressures >20 Discussed with family Plan urgent return to OR for abdominal decompression

## 2020-04-16 NOTE — PROGRESS NOTES
Pt seen and examined. Pt as per Dr. Vianey Hunt. He is on maximal pressor support but BP remains low. To receive more PRBC. On bicarb drip. On exam, is intubated. Lungs grossly clear. Abd is distended. Per Nursing, pt to return to OR this am.  Will reassess then. Will advance ETT 1-2 cm.   
 
0935:  Pt back from OR for Vac for compartment syndrome. BP marginal but better on full pressor support. Blood draining from vac. Will repeat labs this am and abg at noon. Remains very critically ill.    
 
45 min spent in CC mgmt this am.

## 2020-04-16 NOTE — ED TRIAGE NOTES
Pt arrived via EMS due to dizziness, syncopal episodes of \"thirty seconds\" per wife, and right lower quadrant abdominal pain. Pt diaphoretic and jaundiced on arrival. MAP 60 during triage. Pt received one liter of NS IVF enroute. PMH: elevated liver enzymes and ankylosing spondylitis. Pt denies any home medication use.

## 2020-04-16 NOTE — PROGRESS NOTES
0300 TRANSFER - IN REPORT: 
Verbal report received from   Bell Carr (name) on Nitza Mitchell  being received from PACU  for urgent transfer Report consisted of patients Situation, Background, Assessment and  
Recommendations(SBAR). Information from the following report(s) SBAR, Kardex, Intake/Output, MAR, Accordion, Recent Results, Med Rec Status and Cardiac Rhythm sinus tach was reviewed with the receiving nurse. Opportunity for questions and clarification was provided. Assessment completed upon patients arrival to unit and care assumed. Shift Summary note: Patient critically ill, patient received, 2 units of PRBCS by this nurse and 3 units of platelets and 2 units of FFP and 1 of cryo. Patient of mutliple pressor, Vaso, CHAS, levo, and epi. Patient remained unresponsive for this nurses shift. Patient blood pressure is fragile. 0700 Bedside and Verbal shift change report given to Ryan Osborne and Joya Storey RN  (oncoming nurse) by Mary Lou Gruber RN  (offgoing nurse).  Report included the following information SBAR, Kardex, Intake/Output, MAR, Accordion, Recent Results, Med Rec Status, Cardiac Rhythm sinus tach, Alarm Parameters  and Pre Procedure Checklist.

## 2020-04-16 NOTE — ANESTHESIA PREPROCEDURE EVALUATION
Relevant Problems No relevant active problems Anesthetic History No history of anesthetic complications Review of Systems / Medical History Patient summary reviewed, nursing notes reviewed and pertinent labs reviewed Pulmonary Within defined limits Neuro/Psych Within defined limits Cardiovascular Within defined limits Exercise tolerance: >4 METS 
  
GI/Hepatic/Renal 
Within defined limits Endo/Other Within defined limits Arthritis Other Findings Comments: EtOH abuse with overt signs of jaundice Physical Exam 
 
Airway Mallampati: II 
 
Neck ROM: normal range of motion Mouth opening: Normal 
 
 Cardiovascular Regular rate and rhythm,  S1 and S2 normal,  no murmur, click, rub, or gallop Rhythm: regular Rate: normal 
 
 
 
 Dental 
No notable dental hx Pulmonary Breath sounds clear to auscultation Abdominal 
GI exam deferred Other Findings Anesthetic Plan ASA: 4, emergent Anesthesia type: general 
 
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 Brief preop with pt in ER prior to seizure and pt coding. Pt intubated by ER attending however large volume aspiration noted throughout intubation. Right femoral triple lumen placed during code by Dr. Amina Gordon. Pt brought emergently to OR.

## 2020-04-16 NOTE — BRIEF OP NOTE
Brief Postoperative Note Patient: Sincere Juárez YOB: 1960 MRN: [id number] Date of Procedure: 4/16/2020 Pre-Op Diagnosis: Abdominal Compartment Sydrome Post-Op Diagnosis: Same as preoperative diagnosis. Procedure(s): LAPAROTOMY EXPLORATORY Surgeon(s): Robert Nguyen MD 
 
Surgical Assistant: None Anesthesia: General  
 
Estimated Blood Loss (mL): Minimal 
 
Complications: None Specimens: * No specimens in log * Implants: * No implants in log * Drains: * No LDAs found * Findings: 0 Electronically Signed by Krissy Estrada MD on 4/16/2020 at 8:40 AM

## 2020-04-16 NOTE — ED NOTES
approx 2330, pt speaking to this RN. approx 2335 patient became unresponsive and began to have seizure like activity w/ convulsions. This RN and OR nurse began turning patient to side. Patient then began posturing and producing coffee ground emesis.

## 2020-04-16 NOTE — ED PROVIDER NOTES
Date of Service: 
4/15/2020 Patient: 
Christopher Teixeira Chief Complaint: 
Dizziness and Abdominal Pain HPI: 
Christopher Teixeira is a 60 y.o.  male who presents for evaluation of abdominal pain and syncope. Patient states that he felt fine all day. He would to the bathroom shortly before arrival and as soon as he was done he started having some right upper and right lower abdominal discomfort. Patient describes the pain as a 7 out of 10 nonradiating right-sided abdominal discomfort. He also notes that after this happened he had at least 2-3 syncopal episodes. This is according to his wife. Patient arrives here diaphoretic with this abdominal discomfort. He does appear ill at the time of arrival as well as grossly jaundiced. Patient admits to drinking daily. History of liver issues per the patient. He otherwise denies any other acute complaint specifically denying fevers chills chest pain shortness of breath body aches or other respiratory symptoms. Past Medical History:  
Diagnosis Date  Rheumatoid arthritis(714.0) as child/20's  Trauma History reviewed. No pertinent surgical history. Family History:  
Problem Relation Age of Onset  Depression Mother  Stroke Father 80  
 Cancer Father 80  Cancer Brother 46  
     prostate Social History Socioeconomic History  Marital status:  Spouse name: Not on file  Number of children: Not on file  Years of education: Not on file  Highest education level: Not on file Occupational History  Not on file Social Needs  Financial resource strain: Not on file  Food insecurity Worry: Not on file Inability: Not on file  Transportation needs Medical: Not on file Non-medical: Not on file Tobacco Use  Smoking status: Never Smoker  Smokeless tobacco: Never Used Substance and Sexual Activity  Alcohol use: Yes Alcohol/week: 0.0 standard drinks Types: 2 - 3 Cans of beer per week Comment: occasionally  Drug use: No  
 Sexual activity: Yes  
  Partners: Female Lifestyle  Physical activity Days per week: Not on file Minutes per session: Not on file  Stress: Not on file Relationships  Social connections Talks on phone: Not on file Gets together: Not on file Attends Yazidi service: Not on file Active member of club or organization: Not on file Attends meetings of clubs or organizations: Not on file Relationship status: Not on file  Intimate partner violence Fear of current or ex partner: Not on file Emotionally abused: Not on file Physically abused: Not on file Forced sexual activity: Not on file Other Topics Concern  Not on file Social History Narrative  Not on file ALLERGIES: Patient has no known allergies. Review of Systems Constitutional: Negative for chills and fever. HENT: Negative for hearing loss. Eyes: Negative for visual disturbance. Respiratory: Negative for shortness of breath. Cardiovascular: Negative for chest pain. Gastrointestinal: Positive for abdominal pain. Negative for blood in stool, diarrhea and vomiting. Genitourinary: Negative for decreased urine volume and flank pain. Musculoskeletal: Negative for back pain. Skin: Positive for color change. Negative for rash. Neurological: Positive for syncope. Negative for dizziness, weakness and light-headedness. Hematological: Bruises/bleeds easily. Psychiatric/Behavioral: Negative for confusion. Vitals:  
 04/15/20 2104 BP: 94/50 Pulse: (!) 105 Resp: 23 Temp: 97.6 °F (36.4 °C) SpO2: 95% Weight: 99.8 kg (220 lb) Height: 6' 4\" (1.93 m) Physical Exam 
Vitals signs and nursing note reviewed. Constitutional:   
   General: He is not in acute distress. Appearance: He is well-developed. He is ill-appearing and diaphoretic. He is not toxic-appearing. HENT:  
   Head: Normocephalic and atraumatic. Mouth/Throat:  
   Mouth: Mucous membranes are moist.  
Eyes:  
   General: Scleral icterus present. Conjunctiva/sclera: Conjunctivae normal.  
   Pupils: Pupils are equal, round, and reactive to light. Neck: Musculoskeletal: Neck supple. Vascular: No JVD. Trachea: No tracheal deviation. Cardiovascular:  
   Rate and Rhythm: Regular rhythm. Tachycardia present. Heart sounds: No murmur. No friction rub. Pulmonary:  
   Effort: Pulmonary effort is normal. No respiratory distress. Breath sounds: Normal breath sounds. No wheezing or rhonchi. Abdominal:  
   General: Bowel sounds are normal. There is no distension or abdominal bruit. There are no signs of injury. Palpations: Abdomen is soft. Tenderness: There is abdominal tenderness in the right upper quadrant and right lower quadrant. Negative signs include Danielle's sign and McBurney's sign. Musculoskeletal:     
   General: No tenderness or deformity. Skin: 
   General: Skin is warm. Capillary Refill: Capillary refill takes less than 2 seconds. Coloration: Skin is jaundiced. Findings: No rash. Neurological:  
   Mental Status: He is alert and oriented to person, place, and time. Psychiatric:     
   Behavior: Behavior normal.     
   Thought Content: Thought content normal.     
   Judgment: Judgment normal.  
 
  
 
MDM Number of Diagnoses or Management Options Abnormal INR:  
Aneurysm of right renal artery ([address]): Bilirubinemia:  
Jaundice:  
Lactic acidosis:  
Scleral icterus: Thrombocytopenia ([address]): Transaminitis:  
  
Amount and/or Complexity of Data Reviewed Decide to obtain previous medical records or to obtain history from someone other than the patient: yes Risk of Complications, Morbidity, and/or Mortality Presenting problems: high Diagnostic procedures: high Management options: high Critical Care Total time providing critical care: > 105 minutes ED Course as of Apr 15 2317 Wed Apr 15, 2020  
2105 EK Sinus tach 103 Normal axis/intervals Non specific changes, no acute ST changes [GG]  PLATELET(!): 79 [GG]  Lactic acid(!!): 5.2 [GG]  INR(!): 2.5 [GG]  Bilirubin, direct(!): 3.5 [GG]  Bilirubin, total(!): 6.5 [GG]  AST(!): 153 [GG]  Patient verbally consents for blood [GG]  No platelets at this facility [GG] ED Course User Index [GG] Bib Monte,   
 
VITAL SIGNS: 
Patient Vitals for the past 4 hrs: 
 Temp Pulse Resp BP SpO2  
04/15/20 2316 97.5 °F (36.4 °C)      
04/15/20 2300 96.5 °F (35.8 °C) (!) 123 (!) 32 (!) 84/36   
04/15/20 2245  (!) 120 (!) 35 (!) 73/43 99 % 04/15/20 2235  (!) 114 (!) 35 (!) 74/43 100 % 04/15/20 2230  (!) 114 (!) 32 (!) 77/50 98 % 04/15/20 2225  (!) 116 (!) 36 (!) 72/41 99 % 04/15/20 2224  (!) 115 (!) 34 (!) 73/43 97 % 04/15/20 2216  (!) 113 (!) 33 (!) 78/45 97 % 04/15/20 2215  (!) 113 (!) 33 (!) 74/38 97 % 04/15/20 2130  (!) 101 26 93/55 96 % 04/15/20 2120  92 22 108/49 95 % 04/15/20 2115  100 28 94/46 97 % 04/15/20 2104 97.6 °F (36.4 °C) (!) 105 23 94/50 95 % LABS: 
Recent Results (from the past 6 hour(s)) CBC WITH AUTOMATED DIFF Collection Time: 04/15/20  9:13 PM  
Result Value Ref Range WBC 9.2 4.1 - 11.1 K/uL  
 RBC 2.98 (L) 4.10 - 5.70 M/uL  
 HGB 11.4 (L) 12.1 - 17.0 g/dL HCT 32.9 (L) 36.6 - 50.3 % .4 (H) 80.0 - 99.0 FL  
 MCH 38.3 (H) 26.0 - 34.0 PG  
 MCHC 34.7 30.0 - 36.5 g/dL  
 RDW 18.4 (H) 11.5 - 14.5 % PLATELET 79 (L) 834 - 400 K/uL MPV 13.0 (H) 8.9 - 12.9 FL  
 NRBC 0.4 (H) 0.0  WBC ABSOLUTE NRBC 0.04 (H) 0.00 - 0.01 K/uL NEUTROPHILS 62 32 - 75 % BAND NEUTROPHILS 3 0 - 6 % LYMPHOCYTES 25 12 - 49 % MONOCYTES 7 5 - 13 % EOSINOPHILS 2 0 - 7 % BASOPHILS 0 0 - 1 % METAMYELOCYTES 1 (H) 0 % IMMATURE GRANULOCYTES 0 %  
 ABS. NEUTROPHILS 6.0 1.8 - 8.0 K/UL  
 ABS. LYMPHOCYTES 2.3 0.8 - 3.5 K/UL  
 ABS. MONOCYTES 0.6 0.0 - 1.0 K/UL  
 ABS. EOSINOPHILS 0.2 0.0 - 0.4 K/UL  
 ABS. BASOPHILS 0.0 0.0 - 0.1 K/UL  
 ABS. IMM. GRANS. 0.0 K/UL  
 DF MANUAL    
 RBC COMMENTS ANISOCYTOSIS 1+ 
    
 RBC COMMENTS MACROCYTOSIS 
PRESENT 
    
 RBC COMMENTS DAVEY CELLS 
PRESENT 
    
 RBC COMMENTS TARGET CELLS 2+ WBC COMMENTS TOXIC GRANULATION    
METABOLIC PANEL, BASIC Collection Time: 04/15/20  9:13 PM  
Result Value Ref Range Sodium 137 136 - 145 mmol/L Potassium 3.6 3.5 - 5.1 mmol/L Chloride 100 97 - 108 mmol/L  
 CO2 26 21 - 32 mmol/L Anion gap 11 5 - 15 mmol/L Glucose 180 (H) 65 - 100 mg/dL BUN 4 (L) 6 - 20 MG/DL Creatinine 1.10 0.70 - 1.30 MG/DL  
 BUN/Creatinine ratio 4 (L) 12 - 20 GFR est AA >60 >60 ml/min/1.73m2 GFR est non-AA >60 >60 ml/min/1.73m2 Calcium 8.1 (L) 8.5 - 10.1 MG/DL  
TROPONIN I Collection Time: 04/15/20  9:13 PM  
Result Value Ref Range Troponin-I, Qt. <0.05 <0.05 ng/mL LIPASE Collection Time: 04/15/20  9:13 PM  
Result Value Ref Range Lipase 310 73 - 393 U/L  
LACTIC ACID Collection Time: 04/15/20  9:13 PM  
Result Value Ref Range Lactic acid 5.2 (HH) 0.4 - 2.0 MMOL/L  
HEPATIC FUNCTION PANEL Collection Time: 04/15/20  9:13 PM  
Result Value Ref Range Protein, total 6.3 (L) 6.4 - 8.2 g/dL Albumin 2.3 (L) 3.5 - 5.0 g/dL Globulin 4.0 2.0 - 4.0 g/dL A-G Ratio 0.6 (L) 1.1 - 2.2 Bilirubin, total 6.5 (H) 0.2 - 1.0 MG/DL Bilirubin, direct 3.5 (H) 0.0 - 0.2 MG/DL Alk. phosphatase 124 (H) 45 - 117 U/L  
 AST (SGOT) 153 (H) 15 - 37 U/L  
 ALT (SGPT) 48 12 - 78 U/L  
PROTHROMBIN TIME + INR Collection Time: 04/15/20  9:13 PM  
Result Value Ref Range INR 2.5 (H) 0.9 - 1.1 Prothrombin time 23.1 (H) 9.0 - 11.1 sec SAMPLES BEING HELD  Collection Time: 04/15/20  9:18 PM  
 Result Value Ref Range SAMPLES BEING HELD 1 RED 1 SST   
 COMMENT Add-on orders for these samples will be processed based on acceptable specimen integrity and analyte stability, which may vary by analyte. EMERGENT RELEASE OF UNCROSSMATCHED RED CELLS Collection Time: 04/15/20 10:15 PM  
Result Value Ref Range Crossmatch Expiration 04/18/2020 ABO/Rh(D) PENDING Antibody screen PENDING Unit number [id number] Blood component type RC LR Unit division 00 Status of unit ISSUED Crossmatch result Emergency Release Unit number [id number] Blood component type RC LR,2 Unit division 00 Status of unit ISSUED Crossmatch result Emergency Release TYPE & SCREEN Collection Time: 04/15/20 10:22 PM  
Result Value Ref Range Crossmatch Expiration 04/18/2020 ABO/Rh(D) PENDING Antibody screen PENDING Unit number [id number] Blood component type RC LR,1 Unit division 00 Status of unit ISSUED Crossmatch result Emergency Release SAMPLES BEING HELD Collection Time: 04/15/20 10:22 PM  
Result Value Ref Range SAMPLES BEING HELD 1 PINK COMMENT Add-on orders for these samples will be processed based on acceptable specimen integrity and analyte stability, which may vary by analyte. IMAGING: 
CTA ABD PELV W WO CONT Final Result IMPRESSION:  
1. CHEST: Minimal bibasilar atelectasis. Small right pleural effusion. 2..ABDOMEN: Large right retroperitoneal hematoma resulting from rupture of a  
right renal artery aneurysm with acute arterial extravasation. Associated  
displacement of the right kidney, IVC and pancreas, as well as gastric outlet  
obstruction and esophageal distention. The right kidney remains perfused. Hepatic steatosis. Cholelithiasis. 3. PELVIS: No acute abnormality. The findings were called to Dr. Zay Rivera on 4/15/2012 at 2215 hours by  
Dr. Amy Kimball. [facility] [address] CTA CHEST W OR W WO CONT Final Result IMPRESSION:  
1. CHEST: Minimal bibasilar atelectasis. Small right pleural effusion. 2..ABDOMEN: Large right retroperitoneal hematoma resulting from rupture of a  
right renal artery aneurysm with acute arterial extravasation. Associated  
displacement of the right kidney, IVC and pancreas, as well as gastric outlet  
obstruction and esophageal distention. The right kidney remains perfused. Hepatic steatosis. Cholelithiasis. 3. PELVIS: No acute abnormality. The findings were called to Dr. Fariha Kwon on 4/15/2012 at 2215 hours by  
Dr. Abi Cortés. [address] XR CHEST PORT Final Result IMPRESSION: No acute finding on rotated image. Medications During Visit: 
Medications  
0.9% sodium chloride infusion 250 mL (has no administration in time range)  
sodium chloride 0.9 % bolus infusion 1,000 mL (0 mL IntraVENous IV Completed 4/15/20 2224)  
fentaNYL citrate (PF) injection 50 mcg (50 mcg IntraVENous Given 4/15/20 2115) iopamidoL (ISOVUE-370) 76 % injection 100 mL (100 mL IntraVENous Given 4/15/20 2206) phytonadione (vitamin K1) (AQUA-MEPHYTON) 10 mg in 0.9% sodium chloride 50 mL IVPB (10 mg IntraVENous New Bag 4/15/20 2244) DECISION MAKING: 
Juliane Camp is a 60 y.o. male who comes in as above. Patient arrives alert oriented ill-appearing. He is jaundice somewhat hypotensive. Patient's story and appearance is concerning for abdominal bleeding, probably dissection. Labs and imaging obtained. I did accompany the patient to CT and watch the CAT scan performed. It is apparent that he had a probable right renal artery aneurysm with retroperitoneal bleed almost immediately. At this time I called for 2 units of uncrossed matched blood and spoke with interventional radiology as well as vascular surgery. This time patient will be transferred to [address] [place] [address] emergently for OR intervention.   Critical care transport came he was waiting for final word on what hospital.  To.  At this time patient is provided with a total of 3 units of uncrossed matched blood. Normal saline was stopped as to not further dilate what blood he had. Vitamin K is ordered. He is not on any blood thinners which is concerning given his underlying liver issues. Patient is given some push dose pressors by the critical care transport. No central line is placed at this time due to time constraints and his risk of bleeding and his definitive OR treatment here shortly. Patient has remained alert and awake the entire time complaining of abdominal discomfort. Pressure at the time of transport was 87/64 which I feel comfortable with given that is about where his pressures been the entire time he has been here. Patient's wife is here. She has been updated on the seriousness of the condition. [facility]. IMPRESSION: 
1. Aneurysm of right renal artery (HCC) 2. Transaminitis 3. Bilirubinemia 4. Jaundice 5. Thrombocytopenia ([address] 6. Scleral icterus 7. Lactic acidosis 8. Abnormal INR   
 
 
DISPOSITION: 
Admitted Total critical care time spent exclusive of procedures:  108 Procedures

## 2020-04-16 NOTE — CONSULTS
Urology Consult Patient: Avelino Hughes MRN: [id number]  SSN: [id number] YOB: 1960  Age: 60 y.o. Sex: male Date of Encounter:  April 16, 2020 Pre-existing [facility] Patient:    
  
 
History of Present Illness:  Patient is a 60 y.o. male. He presented with bleeding right renal artery aneurysm and hemodynamic instability sp coil now in OR for exlap for abdominal compartment syndrome.  called last night for possible emergent nephrectomy however patient too unstable and was instead taken to ICU for supportive care following coiling. Coagulopathic from massive transfusion and underlying liver disease. Pt was off floor when came by as he was in [address] [place] [address] for exlap. Past Medical History: No Known Allergies Prior to Admission medications Not on File PMHx:  has a past medical history of Rheumatoid arthritis(714.0) and Trauma. He also has no past medical history of Abuse, Arrhythmia, Asthma, Autoimmune disease ([address] CAD (coronary artery disease), Calculus of kidney, Cancer ([address] Chronic kidney disease, Chronic obstructive pulmonary disease ([address] Chronic pain, Congestive heart failure, unspecified, Contact dermatitis and other eczema, due to unspecified cause, Depression, Diabetes ([address] GERD (gastroesophageal reflux disease), Headache(784.0), Hypercholesterolemia, Hypertension, Liver disease, Psychotic disorder ([address] PUD (peptic ulcer disease), Seizures ([address] Stroke ([address] Thromboembolus ([address] or Thyroid disease. PSurgHx:  has no past surgical history on file. PSocHx:  reports that he has never smoked. He has never used smokeless tobacco. He reports current alcohol use. He reports that he does not use drugs. Lab Results Component Value Date/Time  WBC 9.3 04/16/2020 08:15 AM  
 HCT 24.5 (L) 04/16/2020 08:15 AM  
 PLATELET 531 (L) 44/95/3094 08:15 AM  
 Sodium 148 (H) 04/16/2020 05:20 AM  
 Potassium 4.0 04/16/2020 05:20 AM  
 Chloride 111 (H) 04/16/2020 05:20 AM  
 CO2 15 (LL) 04/16/2020 05:20 AM  
 BUN 6 04/16/2020 05:20 AM  
 Creatinine 1.65 (H) 04/16/2020 05:20 AM  
 Glucose 130 (H) 04/16/2020 05:20 AM  
 Calcium 8.1 (L) 04/16/2020 05:20 AM  
 INR 1.9 (H) 04/16/2020 05:20 AM  
 
 
UA:  
Lab Results Component Value Date/Time Color Yellow 11/13/2013 09:08 AM  
 Appearance Cloudy (A) 11/13/2013 09:08 AM  
 pH (UA) 6.0 11/13/2013 09:08 AM  
 Ketone 2+ (A) 11/13/2013 09:08 AM  
 Bilirubin Negative 11/13/2013 09:08 AM  
 Nitrites Negative 11/13/2013 09:08 AM  
 Leukocyte Esterase Trace (A) 11/13/2013 09:08 AM  
 Bacteria Few 11/13/2013 09:08 AM  
 WBC 0-5 11/13/2013 09:08 AM  
 RBC 4-10 (A) 11/13/2013 09:08 AM  
 
 
Xrays: CT reviewed Assessment/Plan: 1. Bleeding Right renal artery aneurysm sp coil 2. Acute blood loss anemia with coagulopathy sp transfusions 3. Abdominal compartment syndrome undergoing exlap Continue stabilization Please re-consult urology as needed. Signed By: Bird Carlson MD  - April 16, 2020

## 2020-04-16 NOTE — OP NOTES
Yvan Monroy [facility] 78 
OPERATIVE REPORT Name:  Hector Caceres 
MR#:  [id number] :  1960 ACCOUNT #:  [de-identified] DATE OF SERVICE:  2020 PREOPERATIVE DIAGNOSIS:  Ruptured right renal artery aneurysm. POSTOPERATIVE DIAGNOSIS:  Ruptured right renal artery aneurysm. PROCEDURES PERFORMED: 
1. Open exploration of left femoral artery. 2.  Aortogram. 
3.  Selective catheterization of right renal artery with right renal artery arteriogram. 
4.  Coil embolization of right renal artery aneurysm SURGEON:  Juan Jose Reno MD 
 
ASSISTANT:  None. ANESTHESIA:  General endotracheal anesthesia. COMPLICATIONS:  None. SPECIMENS REMOVED:  None. IMPLANTS:  Coils. ESTIMATED BLOOD LOSS:  1 liter. INDICATION FOR PROCEDURE:  The patient is a 77-year-old male with a ruptured right renal artery aneurysm. The patient was critically ill and was taken to the operating room urgently for coil embolization. TECHNICAL DETAILS:  The patient was taken to the operating room. Once suitable level of anesthesia was introduced, he was prepped and draped in typical sterile fashion. An oblique incision was made in the left groin and dissection was carried out down to the common femoral artery, which was dissected free of its soft tissue attachments. An 11-Guyanese sheath was placed into the femoral artery and a catheter was introduced into the abdominal aorta. An aortogram was performed. The RIM catheter was then used to select out the right renal artery and the right renal artery arteriogram was performed. This showed the location of aneurysm and demonstrated the active extravasation. Multiple coils were then placed into the proximal right renal artery until cessation of flow was achieved and no more evidence of extravasation. Completion angiography showed good location of coils without evidence of complication. Devices were removed. The vessels were closed with 5-0 Prolene suture and the groin was closed in multiple layers. The patient tolerated the procedure well. He remained critically ill throughout. He was transferred to the ICU in critical condition. MD BERENICE Caballero/[email or web address]/ 
D:  04/16/2020 7:50 
T:  04/16/2020 11:26 
JOB #:  [id number]

## 2020-04-16 NOTE — PROGRESS NOTES
[facility] follow-up visit. Fr. Mariam Ramos administered the Sacrament of the Sick. . Mr. Erlinda Hopkins is unable to received communion. Mrs. Erlinda Hopkins went home for a little time. SHAYY Lenz, RN, ACSW, LCSW  Page:  [phone]

## 2020-04-17 LAB
ABO + RH BLD: NORMAL
ABO + RH BLD: NORMAL
BLD PROD TYP BPU: NORMAL
BLOOD GROUP ANTIBODIES SERPL: NORMAL
BLOOD GROUP ANTIBODIES SERPL: NORMAL
BPU ID: NORMAL
CROSSMATCH RESULT,%XM: NORMAL
SPECIMEN EXP DATE BLD: NORMAL
SPECIMEN EXP DATE BLD: NORMAL
STATUS OF UNIT,%ST: NORMAL
UNIT DIVISION, %UDIV: 0

## 2020-04-17 NOTE — PROGRESS NOTES
1900 Bedside and Verbal shift change report given to Emmanuel Joshi RN  (oncoming nurse) by Kaushik Cota and Bhumika Hanson RN  (offgoing nurse). Report included the following information SBAR, Kardex, OR Summary, Procedure Summary, Intake/Output, MAR, Accordion, Recent Results, Med Rec Status, Cardiac Rhythm sinus tach, Alarm Parameters  and Dual Neuro Assessment. Patient on multiple pressors, vaso,petros,epi and levophed gtt. Patient blood pressure low despite multiple pressors and multiple blood products he has had.   
1930 Nurse spoke with Dr Cyrus Clark concerning patient critical condition and the fact that the wound VAC cannister has been filling up with a hour of new cannister being placed. Dr Cyrus Clark is aware and there is no surgical intervention that can help the patient. Continue on the coarse of treatment until wife instructs staff differently 1945 Nurse started another unit 
2000 Assessment done at this time, patient unresponsive, pupils fixed and dilated, jaundice, no blind reflex present, no gag present. Patient is mottled lower extremities 2030 Nurse spoke with patients wife, made her aware of patient condition and critically ill he is and patients output of wound vac. Patient wife wanting to talk to daughters so that can make decision about care. 2045 Dr Cyrus Clark called patients wife, concerning patient plan of care. Wife Jerrod Shall wants to withdraw care at this time 2050 Morphine and ativan  
2055 Ventilator was turned off ,  Patients family on the phone,phone is at patients ear. 2110 Patient is asystole, no heart sounds present 2140 [facility] notified 2145 Dr Amy Srivastava here to pronounce death  Sonia Fonseca RN, CCRN

## 2020-04-17 NOTE — PROGRESS NOTES
Vascular: 
 
Discussed situation with patient's wife who would like to withdraw support. Will stop pressors and extubate.

## 2020-04-17 NOTE — PROGRESS NOTES
Called to examine patient who has . No response to verbal and tactile stimuli. No respiratory effort. Absent heart sounds and pulses. Pupils fixed and dilated. Patient pronounced dead at 9:55 pm 
 
Death Summary and Death certificate to be completed by Attending Dr Leyda Velasquez.

## 2020-04-17 NOTE — PROGRESS NOTES
I was paged after Mr. Christiano Pascal had . I went to the unit and spoke with his nurse, then called his wife, Barney De Jesus and provided spiritual and emotional support. She expressed appreciation for the call. . Deanna Cho M.Div, [facility] Lead

## 2020-04-23 NOTE — DISCHARGE SUMMARY
Yvan Monroy [facility] 78 DISCHARGE SUMMARY Name:  Gurinder Dong 
MR#:  [id number] :  1960 ACCOUNT #:  [de-identified] ADMIT DATE:  04/15/2020 DISCHARGE DATE:  2020 ADMISSION DIAGNOSIS:  Ruptured renal artery aneurysm. DISCHARGE DIAGNOSIS:  Ruptured renal artery aneurysm. PROCEDURE:  Repair of ruptured aortic aneurysm done on the date of admission. DETAILS OF ADMISSION AND HOSPITAL COURSE:  The patient is a middle-aged male with history of alcoholism and acute abdominal pain resulting from a ruptured renal artery aneurysm. He was transferred immediately to [facility] and taken urgently to the operating room where coil embolization of the renal artery aneurysm was performed. He tolerated the procedure well but remain critically ill. He did have a CPR done in the emergency room prior to the procedure. The next morning, he was noted to have increased abdominal pressures and he was taken to the operating room for laparotomy for relief of this. His condition continued to decline, and in the evening, family elected to allow comfort care and the patient . DISCHARGE DISPOSITION:  [address] [place] [address]. MD BERENICE [email or web address] 
D:  2020 9:24 
T:  2020 17:05 
JOB #:  [id number]

## (undated) DEVICE — SUT PROL 3-0 36IN MH DA BLU --

## (undated) DEVICE — YANKAUER,BULB TIP,W/O VENT,RIGID,STERILE: Brand: MEDLINE

## (undated) DEVICE — 3M™ IOBAN™ 2 ANTIMICROBIAL INCISE DRAPE 6648EZ: Brand: IOBAN™ 2

## (undated) DEVICE — RADIFOCUS GLIDEWIRE: Brand: GLIDEWIRE

## (undated) DEVICE — KIT DSG ABTHERA SENSATRAC

## (undated) DEVICE — INFECTION CONTROL KIT SYS

## (undated) DEVICE — SPONGE LAP 18X18IN STRL -- 5/PK

## (undated) DEVICE — TTL1LYR 16FR10ML 100%SIL TMPST TR: Brand: MEDLINE

## (undated) DEVICE — REM POLYHESIVE ADULT PATIENT RETURN ELECTRODE: Brand: VALLEYLAB

## (undated) DEVICE — BLADE ELECTRODE: Brand: EDGE

## (undated) DEVICE — CANISTER, RIGID, 3000CC: Brand: MEDLINE INDUSTRIES, INC.

## (undated) DEVICE — TORCON NB, ADVANTAGE CATHETER: Brand: TORCON NB

## (undated) DEVICE — CATH BLLN STENT GRAF 12FRX46MM -- RELIANT

## (undated) DEVICE — SUT PROL 3-0 30IN SH BLU --

## (undated) DEVICE — BLANKET WRM W35.4XL86.6IN FULL UNDERBODY + FORC AIR

## (undated) DEVICE — SOLUTION IV 1000ML 0.9% SOD CHL

## (undated) DEVICE — SUT SLK 2-0SH 30IN BLK --

## (undated) DEVICE — SUT SLK 3-0 30IN SH BLK --

## (undated) DEVICE — STERILE POLYISOPRENE POWDER-FREE SURGICAL GLOVES: Brand: PROTEXIS

## (undated) DEVICE — SUTURE PROL SZ 5-0 L24IN NONABSORBABLE BLU RB-2 L13IN 1/2 8554H

## (undated) DEVICE — SUTURE VCRL SZ 3-0 L27IN ABSRB UD L26MM SH 1/2 CIR J416H

## (undated) DEVICE — CANISTER WND VAC ASST CLSR --

## (undated) DEVICE — VASCULAR-RICHMOND-LF: Brand: MEDLINE INDUSTRIES, INC.

## (undated) DEVICE — (D)PREP SKN CHLRAPRP APPL 26ML -- CONVERT TO ITEM 371833

## (undated) DEVICE — YANKAUER,POOLE TIP,STERILE,50/CS: Brand: MEDLINE

## (undated) DEVICE — PINNACLE INTRODUCER SHEATH: Brand: PINNACLE

## (undated) DEVICE — AMC/4 ARTERIAL NEEDLE – 18GA X 2.75” (7CM): Brand: ARTERIAL NEEDLE

## (undated) DEVICE — STRAP,POSITIONING,KNEE/BODY,FOAM,4X60": Brand: MEDLINE

## (undated) DEVICE — COVER LT HNDL PLAS RIG 1 PER PK